# Patient Record
Sex: FEMALE | Race: BLACK OR AFRICAN AMERICAN | NOT HISPANIC OR LATINO | Employment: OTHER | URBAN - METROPOLITAN AREA
[De-identification: names, ages, dates, MRNs, and addresses within clinical notes are randomized per-mention and may not be internally consistent; named-entity substitution may affect disease eponyms.]

---

## 2017-10-12 ENCOUNTER — TELEPHONE (OUTPATIENT)
Dept: FAMILY MEDICINE | Facility: CLINIC | Age: 72
End: 2017-10-12

## 2017-10-12 NOTE — TELEPHONE ENCOUNTER
----- Message from Purnima Barnes sent at 10/12/2017  8:42 AM CDT -----  Contact: Self/ [phone]  Patient would like to speak with you about getting scheduled for a physical. Last time she saw Dr. Coburn was in 2013. Please advise.

## 2017-10-20 ENCOUNTER — LAB VISIT (OUTPATIENT)
Dept: LAB | Facility: HOSPITAL | Age: 72
End: 2017-10-20
Attending: FAMILY MEDICINE
Payer: MEDICARE

## 2017-10-20 ENCOUNTER — OFFICE VISIT (OUTPATIENT)
Dept: FAMILY MEDICINE | Facility: CLINIC | Age: 72
End: 2017-10-20
Payer: COMMERCIAL

## 2017-10-20 VITALS
HEART RATE: 62 BPM | OXYGEN SATURATION: 97 % | WEIGHT: 189.63 LBS | SYSTOLIC BLOOD PRESSURE: 139 MMHG | DIASTOLIC BLOOD PRESSURE: 73 MMHG | HEIGHT: 63 IN | BODY MASS INDEX: 33.6 KG/M2

## 2017-10-20 DIAGNOSIS — I51.89 DIASTOLIC DYSFUNCTION: ICD-10-CM

## 2017-10-20 DIAGNOSIS — Z12.39 SCREENING FOR MALIGNANT NEOPLASM OF BREAST: ICD-10-CM

## 2017-10-20 DIAGNOSIS — M19.90 ARTHRITIS: ICD-10-CM

## 2017-10-20 DIAGNOSIS — I10 ESSENTIAL HYPERTENSION: ICD-10-CM

## 2017-10-20 DIAGNOSIS — Z23 NEED FOR INFLUENZA VACCINATION: ICD-10-CM

## 2017-10-20 DIAGNOSIS — N18.30 CKD (CHRONIC KIDNEY DISEASE), STAGE III: ICD-10-CM

## 2017-10-20 DIAGNOSIS — E66.9 OBESITY (BMI 30.0-34.9): ICD-10-CM

## 2017-10-20 DIAGNOSIS — R73.01 IMPAIRED FASTING GLUCOSE: ICD-10-CM

## 2017-10-20 DIAGNOSIS — N18.30 CKD (CHRONIC KIDNEY DISEASE), STAGE III: Primary | ICD-10-CM

## 2017-10-20 DIAGNOSIS — E78.5 HYPERLIPIDEMIA, UNSPECIFIED HYPERLIPIDEMIA TYPE: ICD-10-CM

## 2017-10-20 DIAGNOSIS — Z23 NEED FOR STREPTOCOCCUS PNEUMONIAE VACCINATION: ICD-10-CM

## 2017-10-20 DIAGNOSIS — H25.10 NUCLEAR SCLEROSIS, UNSPECIFIED LATERALITY: ICD-10-CM

## 2017-10-20 LAB
ALBUMIN SERPL BCP-MCNC: 4 G/DL
ALP SERPL-CCNC: 100 U/L
ALT SERPL W/O P-5'-P-CCNC: 11 U/L
ANION GAP SERPL CALC-SCNC: 10 MMOL/L
AST SERPL-CCNC: 19 U/L
BASOPHILS # BLD AUTO: 0.05 K/UL
BASOPHILS NFR BLD: 0.6 %
BILIRUB SERPL-MCNC: 0.3 MG/DL
BUN SERPL-MCNC: 23 MG/DL
CALCIUM SERPL-MCNC: 10.2 MG/DL
CHLORIDE SERPL-SCNC: 102 MMOL/L
CHOLEST SERPL-MCNC: 258 MG/DL
CHOLEST/HDLC SERPL: 5.9 {RATIO}
CO2 SERPL-SCNC: 29 MMOL/L
CREAT SERPL-MCNC: 1.5 MG/DL
DIFFERENTIAL METHOD: ABNORMAL
EOSINOPHIL # BLD AUTO: 0.2 K/UL
EOSINOPHIL NFR BLD: 2.5 %
ERYTHROCYTE [DISTWIDTH] IN BLOOD BY AUTOMATED COUNT: 16.4 %
EST. GFR  (AFRICAN AMERICAN): 40.1 ML/MIN/1.73 M^2
EST. GFR  (NON AFRICAN AMERICAN): 34.8 ML/MIN/1.73 M^2
GLUCOSE SERPL-MCNC: 95 MG/DL
HCT VFR BLD AUTO: 39.6 %
HDLC SERPL-MCNC: 44 MG/DL
HDLC SERPL: 17.1 %
HGB BLD-MCNC: 12.5 G/DL
IMM GRANULOCYTES # BLD AUTO: 0.03 K/UL
IMM GRANULOCYTES NFR BLD AUTO: 0.4 %
LDLC SERPL CALC-MCNC: 183.8 MG/DL
LYMPHOCYTES # BLD AUTO: 3.3 K/UL
LYMPHOCYTES NFR BLD: 39.4 %
MCH RBC QN AUTO: 24.8 PG
MCHC RBC AUTO-ENTMCNC: 31.6 G/DL
MCV RBC AUTO: 78 FL
MONOCYTES # BLD AUTO: 0.5 K/UL
MONOCYTES NFR BLD: 6.5 %
NEUTROPHILS # BLD AUTO: 4.2 K/UL
NEUTROPHILS NFR BLD: 50.6 %
NONHDLC SERPL-MCNC: 214 MG/DL
NRBC BLD-RTO: 0 /100 WBC
PLATELET # BLD AUTO: 382 K/UL
PMV BLD AUTO: 10.7 FL
POTASSIUM SERPL-SCNC: 3.3 MMOL/L
PROT SERPL-MCNC: 8.5 G/DL
RBC # BLD AUTO: 5.05 M/UL
SODIUM SERPL-SCNC: 141 MMOL/L
TRIGL SERPL-MCNC: 151 MG/DL
WBC # BLD AUTO: 8.28 K/UL

## 2017-10-20 PROCEDURE — 90662 IIV NO PRSV INCREASED AG IM: CPT | Mod: S$GLB,,, | Performed by: FAMILY MEDICINE

## 2017-10-20 PROCEDURE — 80053 COMPREHEN METABOLIC PANEL: CPT

## 2017-10-20 PROCEDURE — 99215 OFFICE O/P EST HI 40 MIN: CPT | Mod: 25,S$GLB,, | Performed by: FAMILY MEDICINE

## 2017-10-20 PROCEDURE — 83036 HEMOGLOBIN GLYCOSYLATED A1C: CPT

## 2017-10-20 PROCEDURE — 36415 COLL VENOUS BLD VENIPUNCTURE: CPT | Mod: PO

## 2017-10-20 PROCEDURE — 80061 LIPID PANEL: CPT

## 2017-10-20 PROCEDURE — 90670 PCV13 VACCINE IM: CPT | Mod: S$GLB,,, | Performed by: FAMILY MEDICINE

## 2017-10-20 PROCEDURE — 90471 IMMUNIZATION ADMIN: CPT | Mod: S$GLB,,, | Performed by: FAMILY MEDICINE

## 2017-10-20 PROCEDURE — 99999 PR PBB SHADOW E&M-EST. PATIENT-LVL V: CPT | Mod: PBBFAC,,, | Performed by: FAMILY MEDICINE

## 2017-10-20 PROCEDURE — 90472 IMMUNIZATION ADMIN EACH ADD: CPT | Mod: S$GLB,,, | Performed by: FAMILY MEDICINE

## 2017-10-20 PROCEDURE — 85025 COMPLETE CBC W/AUTO DIFF WBC: CPT

## 2017-10-20 RX ORDER — BUTALBITAL, ACETAMINOPHEN AND CAFFEINE 50; 325; 40 MG/1; MG/1; MG/1
1 TABLET ORAL EVERY 6 HOURS PRN
Qty: 30 TABLET | Refills: 1 | Status: SHIPPED | OUTPATIENT
Start: 2017-10-20 | End: 2018-06-21

## 2017-10-20 RX ORDER — PANTOPRAZOLE SODIUM 40 MG/1
TABLET, DELAYED RELEASE ORAL
COMMUNITY
Start: 2017-07-31 | End: 2018-04-19

## 2017-10-20 RX ORDER — AMLODIPINE BESYLATE 10 MG/1
10 TABLET ORAL DAILY
Qty: 90 TABLET | Refills: 1 | Status: SHIPPED | OUTPATIENT
Start: 2017-10-20 | End: 2018-04-12 | Stop reason: SDUPTHER

## 2017-10-20 NOTE — PATIENT INSTRUCTIONS
Pneumococcal Vaccination  There are 2 pneumococcal vaccinations that protect people against pneumococcal disease.  Pneumococcal disease  Pneumococcal disease is caused by bacteria (Streptococcus pneumoniae). This germ is easily spread when someone with the bacteria coughs, sneezes, laughs, or talks. You can get pneumococcal disease more than once. This is because there are many different types (strains) of the bacteria. Some strains are also resistant to treatment with antibiotics.  There are different kinds of pneumococcal disease, depending on what part of the body is infected. They include:  · Pneumonia. Infection in the lungs.  · Meningitis. Infection of the covering of the brain and spinal cord.  · Otitis media. Infection of the middle ear.  · Bacteremia or septicemia. Infection in the blood.  Pneumococcal disease can be life-threatening, especially for people in high-risk groups. Each year, thousands of people die of this disease. Thousands more become seriously ill.  The vaccine    The pneumococcal vaccines are the best way to avoid pneumococcal disease. They are safe and effective. The vaccines are given as shots (injections). This can be done at your healthcare provider's office or a health clinic. Drugstores, senior centers, and workplaces often offer vaccinations, too. If you have questions, ask your healthcare provider.  The pneumococcal vaccines are recommended for:  · Persons 65 and older  · Infants  · People with chronic health problems (such as diabetes, chronic lung or heart disease, liver disease)  · People who have a cochlear implant  · People who have weakened immune systems  · People who live in nursing homes or other long-term care facilities  · People who smoke or have asthma  They are given:  · In a 4-dose series in infants  · One time in adults; some people need a second dose of one of the vaccines  Your healthcare provider can tell you more about the vaccines, whether you should get  them, and the number of shots you should get.  Date Last Reviewed: 11/1/2016  © 6063-9881 [employer]. [address] [place] [address], [place] [address]. All rights reserved. This information is not intended as a substitute for professional medical care. Always follow your healthcare professional's instructions.      Chronic Kidney Disease (CKD)     The role of the kidneys is to remove waste products and extra water from the blood.  When the kidneys do not work as they should, waste products begin to build up in the blood. This is called chronic kidney disease (CKD). CKD means that you have kidney damage or a decrease in kidney function lasting at least 3 months. CKD allows extra water, waste, and toxins to build up in the body. This can eventually become life-threatening. You might need dialysis or a kidney transplant to stay alive. This most severe form is called end stage renal disease.  Diabetes is the leading causes of chronic renal failure. Other causes include high blood pressure, hardening of the arteries (atherosclerosis), lupus, inflammation of the blood vessels (vasculitis), and past viral or bacterial infections. Certain over-the-counter pain medicines can cause renal failure when taken often over a long period of time. These include aspirin, ibuprofen, and related anti-inflammatory medicines called NSAIDs (nonsteroidal anti-inflammatory drugs).  Home care  The following guidelines will help you care for yourself at home:  · If you have diabetes, talk with your healthcare provider about keeping your blood sugar under control. Ask if you need to make and changes to your diet, lifestyle, or medicines.  · If you have high blood pressure:  ¨ Take prescribed medicine to lower your blood pressure to the recommended goal of less than 130/80.  ¨ Start a regular exercise program that you enjoy. Check with your healthcare provider to be sure your planned exercise program is right for you.  ¨ Eat less salt  (sodium). Your healthcare provider can tell you how much salt per day is safe for you.  · If you are overweight, talk with your healthcare provider about a weight loss plan.  · If you smoke, you must quit. Smoking makes kidney disease worse. Talk with your healthcare provider about ways to help you quit.  For more information, visit the following links:  ¨ www.Fetch MDfree.gov/sites/default/files/pdf/clearing-the-air-accessible.pdf  ¨ www.smokefree.gov  ¨ www.cancer.org/healthy/stayawayfromtobacco/guidetoquittingsmoking/  · Most people with CKD need to follow a special diet.  Be sure you understand yours. In general, you will need to limit protein, salt, potassium, and phosphorus. You also need to limit how much fluid you drink.   · CKD is a risk factor for heart disease. Talk with your healthcare provider about any other risk factors you might have and what you can do to lessen them.  · Talk with your healthcare provider about any medicines you are taking to find out if they need to be reduced or stopped.  · Don't use the following over-the-counter medicines, or consult your healthcare provider before using:  ¨ Aspirin and NSAIDs such as ibuprofen or naproxen. Using acetaminophen for fever or pain is OK.  ¨ Laxatives and antacids containing magnesium or aluminum  ¨ Fleet or phospho soda enemas containing phosphorus  ¨ Certain stomach acid-blocking medicine such as cimetidine or ranitidine   ¨ Decongestants containing pseudoephedrine   ¨ Herbal supplements  Follow-up care  Follow up with your healthcare provider, or as advised. Contact one of the following for more information:  · American Association of Kidney Patients 026-332-3307 www.aakp.org  · National Kidney Foundation 054-511-9456 www.kidney.org  · American Kidney Fund 523-074-8586 www.kidneyfund.org  · National Kidney Disease Education Program 866-4KIDNEY www.nkdep.nih.gov  If an X-ray, ECG (cardiogram), or other diagnostic test was taken, you will be told of  any new findings that may affect your care.  Call 911  Call 911 if you have any of the following:  · Severe weakness, dizziness, fainting, drowsiness, or confusion  · Chest pain or shortness of breath  · Heart beating fast, slow, or irregularly  When to seek medical advice  Call your healthcare provider right away if any of these occur:  · Nausea or vomiting  · Fever of 100.4°F (38°C) or higher, or as directed by your healthcare provider  · Unexpected weight gain or swelling in the legs, ankles, or around the eyes  · Decrease or absent urine output  Date Last Reviewed: 9/1/2016 © 2000-2017 [employer]. [address] [place] [address], [place] [address]. All rights reserved. This information is not intended as a substitute for professional medical care. Always follow your healthcare professional's instructions.      Low-Salt Diet  This diet removes foods that are high in salt. It also limits the amount of salt you use when cooking. It is most often used for people with high blood pressure, edema (fluid retention), and kidney, liver, or heart disease.  Table salt contains the mineral sodium. Your body needs sodium to work normally. But too much sodium can make your health problems worse. Your healthcare provider is recommending a low-salt (also called low-sodium) diet for you. Your total daily allowance of salt is 1,500 to 2,300 milligrams (mg). It is less than 1 teaspoon of table salt. This means you can have only about 500 to 700 mg of sodium at each meal. People with certain health problems should limit salt intake to the lower end of the recommended range.    When you cook, dont add much salt. If you can cook without using salt, even better. Dont add salt to your food at the table.  When shopping, read food labels. Salt is often called sodium on the label. Choose foods that are salt-free, low salt, or very low salt. Note that foods with reduced salt may not lower your salt intake enough.    Beans,  potatoes, and pasta  Ok: Dry beans, split peas, lentils, potatoes, rice, macaroni, pasta, spaghetti without added salt  Avoid: Potato chips, tortilla chips, and similar products  Breads and cereals  Ok: Low-sodium breads, rolls, cereals, and cakes; low-salt crackers, matzo crackers  Avoid: Salted crackers, pretzels, popcorn, Japanese toast, pancakes, muffins  Dairy  Ok: Milk, chocolate milk, hot chocolate mix, low-salt cheeses, and yogurt  Avoid: Processed cheese and cheese spreads; Roquefort, Camembert, and cottage cheese; buttermilk, instant breakfast drink  Desserts  Ok: Ice cream, frozen yogurt, juice bars, gelatin, cookies and pies, sugar, honey, jelly, hard candy  Avoid: Most pies, cakes and cookies prepared or processed with salt; instant pudding  Drinks  Ok: Tea, coffee, fizzy (carbonated) drinks, juices  Avoid: Flavored coffees, electrolyte replacement drinks, sports drinks  Meats  Ok: All fresh meat, fish, poultry, low-salt tuna, eggs, egg substitute  Avoid: Smoked, pickled, brine-cured, or salted meats and fish. This includes rodrigez, chipped beef, corned beef, hot dogs, deli meats, ham, kosher meats, salt pork, sausage, canned tuna, salted codfish, smoked salmon, herring, sardines, or anchovies.  Seasonings and spices  Ok: Most seasonings are okay. Good substitutes for salt include: fresh herb blends, hot sauce, lemon, garlic, ruiz, vinegar, dry mustard, parsley, cilantro, horseradish, tomato paste, regular margarine, mayonnaise, unsalted butter, cream cheese, vegetable oil, cream, low-salt salad dressing and gravy.  Avoid: Regular ketchup, relishes, pickles, soy sauce, teriyaki sauce, Worcestershire sauce, BBQ sauce, tartar sauce, meat tenderizer, chili sauce, regular gravy, regular salad dressing, salted butter  Soups  Ok: Low-salt soups and broths made with allowed foods  Avoid: Bouillon cubes, soups with smoked or salted meats, regular soup and broth  Vegetables  Ok: Most vegetables are okay; also  low-salt tomato and vegetable juices  Avoid: Sauerkraut and other brine-soaked vegetables; pickles and other pickled vegetables; tomato juice, olives  Date Last Reviewed: 8/1/2016  © 7363-9101 [employer]. [address] [place] [address]. All rights reserved. This information is not intended as a substitute for professional medical care. Always follow your healthcare professional's instructions.

## 2017-10-20 NOTE — PROGRESS NOTES
"Subjective:       Patient ID: Sulema Burdick is a 72 y.o. female.    Chief Complaint: Dry Eye (x one month ); Eye Burn (x one month ); Arm Pain (x 8 month); Fall (x 8 months ago ); Orders (mammogram order ); and Orders (kidney test )    72 year old female presents today, hx of multiple chronic diseases, to establish care. She states that she is very active. She states that she walks a lot with friends and with her dogs. She also cleans in the house. She is stating that she is achy. She has been a stable weight for aprox 5 years. Her diet consists of "what she wants" but in moderation. She eats eggs, toast, minimal bread, more baked then fried food. She does like her pork and sweets.     Has HTN, on amlodipine and HCTZ and apparently spironolactone PRN. Has stopped taking her statin due to myalgias.     Was being seen at [facility]. She had seen a cardiologist in the past, 2015.     PMHx: reviewed in EMR and updated  Meds: reviewed in EMR and updated  Shx: reviewed in EMR and updated  FMHx: reviewed in EMR and updated  Social:  for over 50 years. Two children. Recently retired from [employer], [employer]. She lives in a house. She has about 5 steps, has a railing.     Mammogram: ordered today  PCV13: ordered today  Flu: ordered today  Zoster: to get at the pharmacy  Colonoscopy: done last year and was normal per patient (done at outside facility), reviewed EMR records for the scope prior to that      Hypertension   This is a chronic problem. The current episode started more than 1 year ago. The problem has been waxing and waning since onset. The problem is controlled. Pertinent negatives include no anxiety, blurred vision, chest pain, headaches, peripheral edema or shortness of breath. There are no associated agents to hypertension. Risk factors for coronary artery disease include dyslipidemia, obesity and family history. Past treatments include calcium channel blockers and diuretics. " The current treatment provides moderate improvement. There are no compliance problems.  There is no history of chronic renal disease.   Arthritis   Presents for initial visit. The disease course has been fluctuating. She complains of pain and stiffness. Affected locations include the left hip (left back). Her pain is at a severity of 4/10. Associated symptoms include dry eyes. Pertinent negatives include no fever. Her past medical history is significant for chronic back pain and osteoarthritis. Past treatments include nothing. Factors aggravating her arthritis include entering/exiting a vehicle and activity. Compliance with prior treatments has been variable.   Eye Problem    The left eye is affected. This is a chronic problem. The current episode started more than 1 year ago. There was no injury mechanism (history of cataract surgery, it feels dry and itchy). The pain is mild. There is no known exposure to pink eye. She does not wear contacts. Associated symptoms include itching. Pertinent negatives include no blurred vision or fever. She has tried nothing for the symptoms. The treatment provided no relief.     Review of Systems   Constitutional: Negative for fever.   Eyes: Positive for itching. Negative for blurred vision.   Respiratory: Negative for shortness of breath.    Cardiovascular: Negative for chest pain.   Musculoskeletal: Positive for arthritis and stiffness.   Neurological: Negative for headaches.         Health Maintenance Due   Topic Date Due    TETANUS VACCINE  01/05/1963    Zoster Vaccine  01/05/2006    Pneumococcal (65+) (2 of 2 - PCV13) 06/25/2014    Mammogram  09/14/2016    Influenza Vaccine  08/01/2017     Immunization History   Administered Date(s) Administered    Pneumococcal Polysaccharide - 23 Valent 06/25/2013       Objective:      Physical Exam   Constitutional: She is oriented to person, place, and time. She appears well-developed and well-nourished.   HENT:   Right Ear: Tympanic  membrane, external ear and ear canal normal.   Left Ear: Tympanic membrane, external ear and ear canal normal.   Mouth/Throat: Mucous membranes are normal.   Cobblestoning noted  Nasal congestion noted   Eyes: EOM are normal. Pupils are equal, round, and reactive to light.   Mild conjunctivae injection noted BL   Neck: Normal range of motion. Neck supple.   Cardiovascular: Normal rate, regular rhythm and normal heart sounds.    Pulmonary/Chest: Effort normal and breath sounds normal. No respiratory distress.   Abdominal: Soft. Bowel sounds are normal. She exhibits no distension.   obese   Musculoskeletal: She exhibits no edema.   Neurological: She is alert and oriented to person, place, and time. No cranial nerve deficit or sensory deficit. She exhibits normal muscle tone.   Skin: Skin is warm and dry.   Psychiatric: She has a normal mood and affect. Her speech is normal and behavior is normal.   Nursing note and vitals reviewed.      Assessment:       1. CKD (chronic kidney disease), stage III    2. Diastolic dysfunction    3. Essential hypertension    4. Hyperlipidemia, unspecified hyperlipidemia type    5. Arthritis    6. Nuclear sclerosis, unspecified laterality    7. Screening for malignant neoplasm of breast    8. Obesity (BMI 30.0-34.9)    9. Need for Streptococcus pneumoniae vaccination    10. Impaired fasting glucose    11. Need for influenza vaccination        Plan:         CKD (chronic kidney disease), stage III  Labs today  No records from the previous 3 years  Refer to renal  BP controlled today but patient seems to take her spironolactone intermittently  Low salt diet  Appreciate renal recs  -     Ambulatory referral to Nephrology  -     CBC auto differential; Future; Expected date: 10/20/2017  -     Comprehensive metabolic panel; Future; Expected date: 10/20/2017  -     Ambulatory referral to Cardiology    Essential hypertension  BP OK today  Renal and cards consult  amlodipine and hctz to be  continued  Recommended that patient continue spironolactone  Will check urine microalbumin  -     Ambulatory referral to Nephrology  -     Lipid panel; Future; Expected date: 10/20/2017  -     amlodipine (NORVASC) 10 MG tablet; Take 1 tablet (10 mg total) by mouth once daily.  Dispense: 90 tablet; Refill: 1  -     Ambulatory referral to Cardiology    Hyperlipidemia, unspecified hyperlipidemia type  Has declined statin in the past  Will try to start on lower dose based on new lab work  -     Lipid panel; Future; Expected date: 10/20/2017    Arthritis  No NSAIDs given CKD, counseled patient repeatedly on this  OK to take tylenol    Nuclear sclerosis, unspecified laterality  -     Ambulatory referral to Ophthalmology    Screening for malignant neoplasm of breast  -     Mammo Digital Screening Bilat with CAD; Future; Expected date: 10/20/2017    Obesity (BMI 30.0-34.9)  Diet and Exercise  DASH diet  -     Hemoglobin A1c; Future; Expected date: 10/20/2017    Need for Streptococcus pneumoniae vaccination  -     (In Office Administered) Pneumococcal Conjugate Vaccine (13 Valent) (IM)    Impaired fasting glucose  -     Hemoglobin A1c; Future; Expected date: 10/20/2017    Other orders  -     butalbital-acetaminophen-caffeine -40 mg (FIORICET, ESGIC) -40 mg per tablet; Take 1 tablet by mouth every 6 (six) hours as needed for Pain.  Dispense: 30 tablet; Refill: 1      40 minutes spent with patient, of which >50% was spent on counseling and coordination of care.

## 2017-10-21 LAB
ESTIMATED AVG GLUCOSE: 117 MG/DL
HBA1C MFR BLD HPLC: 5.7 %

## 2017-10-27 ENCOUNTER — TELEPHONE (OUTPATIENT)
Dept: FAMILY MEDICINE | Facility: CLINIC | Age: 72
End: 2017-10-27

## 2017-10-27 NOTE — TELEPHONE ENCOUNTER
I have tried calling the patient multiple times on the number at file and it doesn't ring. I have had my staff try and they have had the same results. I tried calling the 's number today and also got no response. I have sent a letter to the patient with the following note:     Your results are abnormal. I have tried calling the number on file multiple times over the last two weeks but have not been able to get through. Your results show that you have high cholesterol and that the ACC/AHA guidelines suggest you should be on a moderate to high intensity statin. I know that you have declined a statin medication in the past, so we can discuss starting a low dose statin in the future. In addition, your A1c shows that you are pre-diabetic. Please decrease your intake of white bread, white rice, corn, pasta, potatoes and sugar to prevent diabetes. Regular daily exercise will also decrease your risk of developing diabetes. Your kidney function (GFR) has also decreased and I would strongly advise you to see the kidney doctor for the appointment that has been scheduled on 11/29/17. It is vital that you have proper blood pressure control in order to keep your kidney's functioning properly. Your potassium was also slightly low, please start an over the counter potassium replacement once daily. Please don't hesitate to call our office if you have any questions or concerns and please try to see me earlier then your currently scheduled appointment.     This message will be sent to my staff as an FYI. If she call back, I would like to schedule her to see me earlier then her current appointment.

## 2017-10-27 NOTE — TELEPHONE ENCOUNTER
Spoke with patient about getting a sooner appointment with Dr Brunner on 11/01/17 @ 10 am to review labs.

## 2017-11-02 ENCOUNTER — OFFICE VISIT (OUTPATIENT)
Dept: FAMILY MEDICINE | Facility: CLINIC | Age: 72
End: 2017-11-02
Payer: MEDICARE

## 2017-11-02 VITALS
DIASTOLIC BLOOD PRESSURE: 89 MMHG | BODY MASS INDEX: 33.49 KG/M2 | HEIGHT: 63 IN | OXYGEN SATURATION: 97 % | HEART RATE: 81 BPM | SYSTOLIC BLOOD PRESSURE: 150 MMHG | WEIGHT: 189 LBS

## 2017-11-02 DIAGNOSIS — I10 ESSENTIAL HYPERTENSION: Primary | ICD-10-CM

## 2017-11-02 DIAGNOSIS — E78.5 HYPERLIPIDEMIA, UNSPECIFIED HYPERLIPIDEMIA TYPE: ICD-10-CM

## 2017-11-02 DIAGNOSIS — N18.30 CKD (CHRONIC KIDNEY DISEASE), SYMPTOM MANAGEMENT ONLY, STAGE 3 (MODERATE): ICD-10-CM

## 2017-11-02 DIAGNOSIS — L29.9 ITCHING: ICD-10-CM

## 2017-11-02 DIAGNOSIS — R73.01 ELEVATED FASTING GLUCOSE: ICD-10-CM

## 2017-11-02 DIAGNOSIS — R80.9 MICROALBUMINURIA: ICD-10-CM

## 2017-11-02 PROCEDURE — 99999 PR PBB SHADOW E&M-EST. PATIENT-LVL III: CPT | Mod: PBBFAC,,, | Performed by: FAMILY MEDICINE

## 2017-11-02 PROCEDURE — 99215 OFFICE O/P EST HI 40 MIN: CPT | Mod: S$GLB,,, | Performed by: FAMILY MEDICINE

## 2017-11-02 RX ORDER — CETIRIZINE HYDROCHLORIDE 10 MG/1
10 TABLET ORAL DAILY
Qty: 30 TABLET | Refills: 1 | COMMUNITY
Start: 2017-11-02 | End: 2018-10-25 | Stop reason: SDUPTHER

## 2017-11-02 RX ORDER — SPIRONOLACTONE 25 MG/1
25 TABLET ORAL DAILY
Qty: 90 TABLET | Refills: 0
Start: 2017-11-02 | End: 2018-09-11 | Stop reason: ALTCHOICE

## 2017-11-02 RX ORDER — ATORVASTATIN CALCIUM 10 MG/1
10 TABLET, FILM COATED ORAL DAILY
Qty: 90 TABLET | Refills: 3 | Status: SHIPPED | OUTPATIENT
Start: 2017-11-02 | End: 2018-04-19 | Stop reason: ALTCHOICE

## 2017-11-02 NOTE — PROGRESS NOTES
"Subjective:       Patient ID: Sulema Burdick is a 72 y.o. female.    Chief Complaint: No chief complaint on file.    Sulema is a 72 y.o. female well known to me who presents today for follow up on lab results. Physician was initially unable to get a hold of the patient. Her results show that she had high cholesterol and that the ACC/AHA guidelines suggest she should be on a moderate to high intensity statin. She had previously declined a statin medication in the past, however she was agreeable to starting atorvastatin 10 mg today. In addition, her A1c shows that she is pre-diabetic. Patient was counseled to  decrease her intake of white bread, white rice, corn, pasta, potatoes and sugar to prevent diabetes. Regular daily exercise will also decrease her risk of developing diabetes.     Lastly, patient was counseled that her  kidney function (GFR) has  decreased and she has an appointment to see the kidney doctor scheduled on 11/29/17. It is vital that she has proper blood pressure control in order to keep your kidney's functioning properly. Patient reports that she only takes her spironolactone about 1x/week. Given resistant BP and low potassium, it's possible that patient has hyperaldosteronism and this is being treated with the spironolactone Patient reports that the medicine "makes her spit" which she really dislikes to do at [employer]. She is agreeable to taking the medicine M-F when she does not go to [employer].     Although her potassium was also slightly low, potassium is not started as patient agrees to take spironolactone regularly.     Patient is also complaining of some itching. No associated fevers, rash, headache, double vision, blurry vision. No swelling in her legs noted today.       Review of Systems   Constitutional: Negative for fever.   Eyes: Negative for visual disturbance.   Respiratory: Negative for cough.    Cardiovascular: Negative for leg swelling.   Neurological: Negative for headaches.         Lab " Review   Lab Visit on 10/20/2017   Component Date Value    Microalbum.,U,Random 10/20/2017 79.0     Creatinine, Random Ur 10/20/2017 51.0     Microalb Creat Ratio 10/20/2017 154.9*   Lab Visit on 10/20/2017   Component Date Value    WBC 10/20/2017 8.28     RBC 10/20/2017 5.05     Hemoglobin 10/20/2017 12.5     Hematocrit 10/20/2017 39.6     MCV 10/20/2017 78*    MCH 10/20/2017 24.8*    MCHC 10/20/2017 31.6*    RDW 10/20/2017 16.4*    Platelets 10/20/2017 382*    MPV 10/20/2017 10.7     Immature Granulocytes 10/20/2017 0.4     Gran # 10/20/2017 4.2     Immature Grans (Abs) 10/20/2017 0.03     Lymph # 10/20/2017 3.3     Mono # 10/20/2017 0.5     Eos # 10/20/2017 0.2     Baso # 10/20/2017 0.05     nRBC 10/20/2017 0     Gran% 10/20/2017 50.6     Lymph% 10/20/2017 39.4     Mono% 10/20/2017 6.5     Eosinophil% 10/20/2017 2.5     Basophil% 10/20/2017 0.6     Differential Method 10/20/2017 Automated     Sodium 10/20/2017 141     Potassium 10/20/2017 3.3*    Chloride 10/20/2017 102     CO2 10/20/2017 29     Glucose 10/20/2017 95     BUN, Bld 10/20/2017 23     Creatinine 10/20/2017 1.5*    Calcium 10/20/2017 10.2     Total Protein 10/20/2017 8.5*    Albumin 10/20/2017 4.0     Total Bilirubin 10/20/2017 0.3     Alkaline Phosphatase 10/20/2017 100     AST 10/20/2017 19     ALT 10/20/2017 11     Anion Gap 10/20/2017 10     eGFR if  10/20/2017 40.1*    eGFR if non  Amer* 10/20/2017 34.8*    Hemoglobin A1C 10/21/2017 5.7*    Estimated Avg Glucose 10/21/2017 117     Cholesterol 10/20/2017 258*    Triglycerides 10/20/2017 151*    HDL 10/20/2017 44     LDL Cholesterol 10/20/2017 183.8*    HDL/Chol Ratio 10/20/2017 17.1*    Total Cholesterol/HDL Ra* 10/20/2017 5.9*    Non-HDL Cholesterol 10/20/2017 214         Objective:     Vitals:    11/02/17 1024   BP: (!) 150/89   Pulse: 81        Physical Exam   Constitutional: She appears well-developed and  well-nourished.   HENT:   Head: Normocephalic and atraumatic.   Cardiovascular: Normal rate and regular rhythm.    Pulmonary/Chest: Effort normal and breath sounds normal.   Abdominal: Soft.   Musculoskeletal: She exhibits no edema.   Neurological: No sensory deficit. She exhibits normal muscle tone.   Skin: Skin is warm. No rash noted.   Psychiatric: She has a normal mood and affect. Her behavior is normal. Judgment and thought content normal.   Nursing note and vitals reviewed.      Assessment:       1. Essential hypertension    2. Hyperlipidemia, unspecified hyperlipidemia type    3. CKD (chronic kidney disease), symptom management only, stage 3 (moderate)    4. Itching    5. Microalbuminuria    6. Elevated fasting glucose        Plan:       Patient seen today to review lab results that had multiple abnormalities that required follow up. Lab results and explanation reviewed extensively with the patient. She was counseled on the importance of adequate BP control and avoidance of NSAIDs. She will go to her appointment with the nephrologist as scheduled. She agrees to take aldactone M-F, defer medication management to renal pending her BP results after adequate therapy. Ddx includes hyperaldosteronism as patient is hypokalemic with uncontrolled Bp, but spironolactone will treat the BP and low potassium if taken as directed. This was also explained to the patient. Atorvastatin 10 mg started after discussion with patient, she was resistant initially but is agreeable. Zyrtec was started for itching. No signs of rash noted. If itching persists, will consider derm referral.     Patient was advised to decrease her intake of white bread, white rice, corn, pasta, potatoes and sugar to prevent diabetes. Regular daily exercise will also decrease her risk of developing diabetes.    Patient reports GERD is better controlled after she started taking Protonix first thing in the Am.       Plan  -     spironolactone (ALDACTONE) 25  MG tablet; Take 1 tablet (25 mg total) by mouth once daily.  Dispense: 90 tablet; Refill: 0  -     atorvastatin (LIPITOR) 10 MG tablet; Take 1 tablet (10 mg total) by mouth once daily.  Dispense: 90 tablet; Refill: 3  -     cetirizine (ZYRTEC) 10 MG tablet; Take 1 tablet (10 mg total) by mouth once daily.  Dispense: 30 tablet; Refill: 1    Warning signs discussed, patient to call with any further issues or worsening of symptoms.     40 minutes spent with patient, of which >50% was spent on counseling and coordination of care.

## 2017-11-02 NOTE — PATIENT INSTRUCTIONS
Low-Salt Diet  This diet removes foods that are high in salt. It also limits the amount of salt you use when cooking. It is most often used for people with high blood pressure, edema (fluid retention), and kidney, liver, or heart disease.  Table salt contains the mineral sodium. Your body needs sodium to work normally. But too much sodium can make your health problems worse. Your healthcare provider is recommending a low-salt (also called low-sodium) diet for you. Your total daily allowance of salt is 1,500 to 2,300 milligrams (mg). It is less than 1 teaspoon of table salt. This means you can have only about 500 to 700 mg of sodium at each meal. People with certain health problems should limit salt intake to the lower end of the recommended range.    When you cook, dont add much salt. If you can cook without using salt, even better. Dont add salt to your food at the table.  When shopping, read food labels. Salt is often called sodium on the label. Choose foods that are salt-free, low salt, or very low salt. Note that foods with reduced salt may not lower your salt intake enough.    Beans, potatoes, and pasta  Ok: Dry beans, split peas, lentils, potatoes, rice, macaroni, pasta, spaghetti without added salt  Avoid: Potato chips, tortilla chips, and similar products  Breads and cereals  Ok: Low-sodium breads, rolls, cereals, and cakes; low-salt crackers, matzo crackers  Avoid: Salted crackers, pretzels, popcorn, Romanian toast, pancakes, muffins  Dairy  Ok: Milk, chocolate milk, hot chocolate mix, low-salt cheeses, and yogurt  Avoid: Processed cheese and cheese spreads; Roquefort, Camembert, and cottage cheese; buttermilk, instant breakfast drink  Desserts  Ok: Ice cream, frozen yogurt, juice bars, gelatin, cookies and pies, sugar, honey, jelly, hard candy  Avoid: Most pies, cakes and cookies prepared or processed with salt; instant pudding  Drinks  Ok: Tea, coffee, fizzy (carbonated) drinks, juices  Avoid: Flavored  coffees, electrolyte replacement drinks, sports drinks  Meats  Ok: All fresh meat, fish, poultry, low-salt tuna, eggs, egg substitute  Avoid: Smoked, pickled, brine-cured, or salted meats and fish. This includes rodrigez, chipped beef, corned beef, hot dogs, deli meats, ham, kosher meats, salt pork, sausage, canned tuna, salted codfish, smoked salmon, herring, sardines, or anchovies.  Seasonings and spices  Ok: Most seasonings are okay. Good substitutes for salt include: fresh herb blends, hot sauce, lemon, garlic, ruiz, vinegar, dry mustard, parsley, cilantro, horseradish, tomato paste, regular margarine, mayonnaise, unsalted butter, cream cheese, vegetable oil, cream, low-salt salad dressing and gravy.  Avoid: Regular ketchup, relishes, pickles, soy sauce, teriyaki sauce, Worcestershire sauce, BBQ sauce, tartar sauce, meat tenderizer, chili sauce, regular gravy, regular salad dressing, salted butter  Soups  Ok: Low-salt soups and broths made with allowed foods  Avoid: Bouillon cubes, soups with smoked or salted meats, regular soup and broth  Vegetables  Ok: Most vegetables are okay; also low-salt tomato and vegetable juices  Avoid: Sauerkraut and other brine-soaked vegetables; pickles and other pickled vegetables; tomato juice, olives  Date Last Reviewed: 8/1/2016 © 2000-2017 [employer]. [address] [place] [address]. All rights reserved. This information is not intended as a substitute for professional medical care. Always follow your healthcare professional's instructions.        4 Steps for Eating Healthier  Changing the way you eat can improve your health. It can lower your cholesterol and blood pressure, and help you stay at a healthy weight. Your diet doesnt have to be bland and boring to be healthy. Just watch your calories and follow these steps:    1. Eat fewer unhealthy fats  · Choose more fish and lean meats instead of fatty cuts of meat.  · Skip butter and lard, and use less  margarine.  · Pass on foods that have palm, coconut, or hydrogenated oils.  · Eat fewer high-fat dairy foods like cheese, ice cream, and whole milk.  · Get a heart-healthy cookbook and try some low-fat recipes.  2. Go light on salt  · Keep the saltshaker off the table.  · Limit high-salt ingredients, such as soy sauce, bouillon, and garlic salt.  · Instead of adding salt when cooking, season your food with herbs and flavorings. Try lemon, garlic, and onion.  · Limit convenience foods, such as boxed or canned foods and restaurant food.  · Read food labels and choose lower-sodium options.  3. Limit sugar  · Pause before you add sugars to pancakes, cereal, coffee, or tea. This includes white and brown table sugar, syrup, honey, and molasses. Cut your usual amount by half.  · Use non-sugar sweeteners. Stevia, aspartame, and sucralose can satisfy a sweet tooth without adding calories.  · Swap out sugar-filled soda and other drinks. Buy sugar-free or low-calorie beverages. Remember water is always the best choice.  · Read labels and choose foods with less added sugar. Keep in mind that dairy foods and foods with fruit will have some natural sugar.  · Cut the sugar in recipes by 1/3 to 1/2. Boost the flavor with extracts like almond, vanilla, or orange. Or add spices such as cinnamon or nutmeg.  4. Eat more fiber  · Eat fresh fruits and vegetables every day.  · Boost your diet with whole grains. Go for oats, whole-grain rice, and bran.  · Add beans and lentils to your meals.  · Drink more water to match your fiber increase. This is to help prevent constipation.  Date Last Reviewed: 5/11/2015  © 2713-2933 [employer]. [address] [place] [address], [place] [address]. All rights reserved. This information is not intended as a substitute for professional medical care. Always follow your healthcare professional's instructions.

## 2017-11-06 ENCOUNTER — OFFICE VISIT (OUTPATIENT)
Dept: OPHTHALMOLOGY | Facility: CLINIC | Age: 72
End: 2017-11-06
Payer: MEDICARE

## 2017-11-06 DIAGNOSIS — H16.223 KERATOCONJUNCTIVITIS SICCA NOT SPECIFIED AS SJOGREN'S, BILATERAL: ICD-10-CM

## 2017-11-06 DIAGNOSIS — Z98.41 STATUS POST CATARACT EXTRACTION AND INSERTION OF INTRAOCULAR LENS, RIGHT: ICD-10-CM

## 2017-11-06 DIAGNOSIS — Z96.1 STATUS POST CATARACT EXTRACTION AND INSERTION OF INTRAOCULAR LENS, RIGHT: ICD-10-CM

## 2017-11-06 DIAGNOSIS — H25.11 NUCLEAR SCLEROTIC CATARACT OF RIGHT EYE: Primary | ICD-10-CM

## 2017-11-06 PROCEDURE — 92004 COMPRE OPH EXAM NEW PT 1/>: CPT | Mod: S$GLB,,, | Performed by: OPHTHALMOLOGY

## 2017-11-06 PROCEDURE — 99999 PR PBB SHADOW E&M-EST. PATIENT-LVL III: CPT | Mod: PBBFAC,,, | Performed by: OPHTHALMOLOGY

## 2017-11-06 NOTE — LETTER
November 7, 2017      Usman Brunner, DO  [address]  [place] [address]           [facility] Ophthalmology  [address]  [facility] [address]  Phone: [phone]  Fax: [phone]          Patient: Sulema Burdick   MR Number: [id number]   YOB: 1945   Date of Visit: 11/6/2017       Dear Dr. Usman Brunner:    Thank you for referring Sulema Burdick to me for evaluation. Attached you will find relevant portions of my assessment and plan of care.    If you have questions, please do not hesitate to call me. I look forward to following Sulema Burdick along with you.    Sincerely,    Rosy Ballard MD    Enclosure  CC:  No Recipients    If you would like to receive this communication electronically, please contact [email or web address] or [phone] to request more information on [employer] Link access.    For providers and/or their staff who would like to refer a patient to [facility], please contact us through our one-stop-shop provider referral line, [facility], at [phone].    If you feel you have received this communication in error or would no longer like to receive these types of communications, please e-mail [email or web address]

## 2017-11-06 NOTE — PROGRESS NOTES
HPI     Oc Suarez MD    S/p phaco w/IOL OS 7/8/13    Gtts: Visine PRN    Patient returns for cataract eval OD.  c/o dry, itchy and   irritated eyes and FBS. Pt started using visine once a day OU. Occasional   tracy pain OS. No f/f.     Last edited by Rosy Ballard MD on 11/6/2017 10:49 AM. (History)            Assessment /Plan     For exam results, see Encounter Report.    Nuclear sclerotic cataract of right eye    Status post cataract extraction and insertion of intraocular lens, right    Keratoconjunctivitis sicca not specified as Sjogren's, bilateral      S/p phaco w/IOL OS 7/8/13 - benjy  - early PCO, not VS    CAT OD  - not VS, observe    K sicca  - ATs, stop visine.    F/up dr. Abbott 6 mo

## 2017-11-20 ENCOUNTER — TELEPHONE (OUTPATIENT)
Dept: FAMILY MEDICINE | Facility: CLINIC | Age: 72
End: 2017-11-20

## 2017-11-20 ENCOUNTER — HOSPITAL ENCOUNTER (OUTPATIENT)
Dept: RADIOLOGY | Facility: HOSPITAL | Age: 72
Discharge: HOME | End: 2017-11-20
Attending: FAMILY MEDICINE
Payer: MEDICARE

## 2017-11-20 DIAGNOSIS — Z12.39 SCREENING FOR MALIGNANT NEOPLASM OF BREAST: ICD-10-CM

## 2017-11-20 PROCEDURE — 77067 SCR MAMMO BI INCL CAD: CPT | Mod: 26,,, | Performed by: RADIOLOGY

## 2017-11-20 PROCEDURE — 77067 SCR MAMMO BI INCL CAD: CPT | Mod: TC

## 2017-11-20 PROCEDURE — 77063 BREAST TOMOSYNTHESIS BI: CPT | Mod: 26,,, | Performed by: RADIOLOGY

## 2017-11-20 NOTE — TELEPHONE ENCOUNTER
----- Message from Usman Brunner DO sent at 11/20/2017 11:49 AM CST -----  Please call the patient regarding her normal result.

## 2018-04-09 DIAGNOSIS — I10 ESSENTIAL HYPERTENSION: ICD-10-CM

## 2018-04-09 RX ORDER — HYDROCHLOROTHIAZIDE 25 MG/1
TABLET ORAL
Qty: 30 TABLET | Refills: 0 | Status: SHIPPED | OUTPATIENT
Start: 2018-04-09 | End: 2018-06-18 | Stop reason: SDUPTHER

## 2018-04-12 DIAGNOSIS — I10 ESSENTIAL HYPERTENSION: ICD-10-CM

## 2018-04-12 RX ORDER — AMLODIPINE BESYLATE 10 MG/1
TABLET ORAL
Qty: 30 TABLET | Refills: 0 | Status: SHIPPED | OUTPATIENT
Start: 2018-04-12 | End: 2018-05-09 | Stop reason: SDUPTHER

## 2018-04-12 RX ORDER — AMLODIPINE BESYLATE 10 MG/1
TABLET ORAL
Qty: 90 TABLET | Refills: 0 | OUTPATIENT
Start: 2018-04-12

## 2018-04-12 NOTE — TELEPHONE ENCOUNTER
She needs to see either myself or renal for further medication refills. Please call patient and let her know

## 2018-04-12 NOTE — TELEPHONE ENCOUNTER
----- Message from Usman Brunner DO sent at 4/12/2018 10:33 AM CDT -----  She needs to see either me or renal to see her Bp. I have refilled her rx x 1

## 2018-04-19 ENCOUNTER — OFFICE VISIT (OUTPATIENT)
Dept: FAMILY MEDICINE | Facility: CLINIC | Age: 73
End: 2018-04-19
Payer: MEDICARE

## 2018-04-19 VITALS
HEIGHT: 63 IN | BODY MASS INDEX: 34.25 KG/M2 | DIASTOLIC BLOOD PRESSURE: 70 MMHG | OXYGEN SATURATION: 95 % | HEART RATE: 67 BPM | WEIGHT: 193.31 LBS | SYSTOLIC BLOOD PRESSURE: 158 MMHG

## 2018-04-19 DIAGNOSIS — M54.32 SCIATICA OF LEFT SIDE: ICD-10-CM

## 2018-04-19 DIAGNOSIS — Z78.9 STATIN INTOLERANCE: ICD-10-CM

## 2018-04-19 DIAGNOSIS — R73.01 ELEVATED FASTING GLUCOSE: ICD-10-CM

## 2018-04-19 DIAGNOSIS — J32.9 CHRONIC SINUSITIS, UNSPECIFIED LOCATION: ICD-10-CM

## 2018-04-19 DIAGNOSIS — E87.6 HYPOKALEMIA: ICD-10-CM

## 2018-04-19 DIAGNOSIS — K21.9 GASTROESOPHAGEAL REFLUX DISEASE, ESOPHAGITIS PRESENCE NOT SPECIFIED: ICD-10-CM

## 2018-04-19 DIAGNOSIS — R80.9 MICROALBUMINURIA: ICD-10-CM

## 2018-04-19 DIAGNOSIS — N18.30 CKD (CHRONIC KIDNEY DISEASE), STAGE III: ICD-10-CM

## 2018-04-19 DIAGNOSIS — R05.3 CHRONIC COUGH: ICD-10-CM

## 2018-04-19 DIAGNOSIS — I10 ESSENTIAL HYPERTENSION: Primary | ICD-10-CM

## 2018-04-19 DIAGNOSIS — E78.5 HYPERLIPIDEMIA, UNSPECIFIED HYPERLIPIDEMIA TYPE: ICD-10-CM

## 2018-04-19 PROCEDURE — 99214 OFFICE O/P EST MOD 30 MIN: CPT | Mod: S$GLB,,, | Performed by: FAMILY MEDICINE

## 2018-04-19 PROCEDURE — 99999 PR PBB SHADOW E&M-EST. PATIENT-LVL V: CPT | Mod: PBBFAC,,, | Performed by: FAMILY MEDICINE

## 2018-04-19 PROCEDURE — 3077F SYST BP >= 140 MM HG: CPT | Mod: CPTII,S$GLB,, | Performed by: FAMILY MEDICINE

## 2018-04-19 PROCEDURE — 3078F DIAST BP <80 MM HG: CPT | Mod: CPTII,S$GLB,, | Performed by: FAMILY MEDICINE

## 2018-04-19 RX ORDER — SIMVASTATIN 20 MG/1
20 TABLET, FILM COATED ORAL NIGHTLY
Qty: 90 TABLET | Refills: 0 | Status: SHIPPED | OUTPATIENT
Start: 2018-04-19 | End: 2018-06-18

## 2018-04-19 RX ORDER — AMOXICILLIN 500 MG/1
500 CAPSULE ORAL EVERY 12 HOURS
Qty: 14 CAPSULE | Refills: 0 | Status: SHIPPED | OUTPATIENT
Start: 2018-04-19 | End: 2018-04-26

## 2018-04-19 NOTE — PATIENT INSTRUCTIONS
Tips to Control Acid Reflux    To control acid reflux, youll need to make some basic diet and lifestyle changes. The simple steps outlined below may be all youll need to ease discomfort.  Watch what you eat  · Avoid fatty foods and spicy foods.  · Eat fewer acidic foods, such as citrus and tomato-based foods. These can increase symptoms.  · Limit drinking alcohol, caffeine, and fizzy beverages. All increase acid reflux.  · Try limiting chocolate, peppermint, and spearmint. These can worsen acid reflux in some people.  Watch when you eat  · Avoid lying down for 3 hours after eating.  · Do not snack before going to bed.  Raise your head  Raising your head and upper body by 4 to 6 inches helps limit reflux when youre lying down. Put blocks under the head of your bed frame to raise it.  Other changes  · Lose weight, if you need to  · Dont exercise near bedtime  · Avoid tight-fitting clothes  · Limit aspirin and ibuprofen  · Stop smoking   Date Last Reviewed: 7/1/2016  © 7657-1095 [employer], [employer]. [address] [place] [address], [place] [address]. All rights reserved. This information is not intended as a substitute for professional medical care. Always follow your healthcare professional's instructions.

## 2018-04-19 NOTE — PROGRESS NOTES
"Subjective:       Patient ID: Sulema Burdick is a 73 y.o. female.    Chief Complaint: Follow-up (recheck BP) and Hypertension    Sulema is a 73 y.o. female who presents today for BP follow up. She is taking her spironolactone, 4 times a week. She missed two appointments with her renal physician. She has not taken the aldactone yet today. She was told to take the aldactone BID daily, but only takes it once daily because of a side effect of mucous production. Her compromise is to take the aldactone, daily, on days when she doesn't have to go to [employer]. She has been on blood pressure medication for years. She states that it hasn't been well controlled for a long time. She was found to be hypokalemic on previous lab work. When her pressures are high, she does have some headaches.   GERD: on zantac. Seems to help, but she 'likes spicy food"  Cough: she states that the cough that she is having is slowly improving. She states that she has had a cold for two months. The cough is productive, white sputum. No fevers or chills. She takes zyrtec and flonase and this helps a little.   Leg Pain: she has leg pain that seems to start on her left piriformis and goes down her leg. This got better with therapy in the past but has worsened again.       Hypertension   This is a chronic problem. The current episode started more than 1 year ago. The problem has been waxing and waning since onset. The problem is uncontrolled. Pertinent negatives include no blurred vision, chest pain, headaches, orthopnea, palpitations, peripheral edema or shortness of breath. There are no associated agents to hypertension. Risk factors for coronary artery disease include dyslipidemia and obesity. Past treatments include diuretics and calcium channel blockers. The current treatment provides mild improvement. Compliance problems include psychosocial issues and medication side effects.  Hypertensive end-organ damage includes kidney disease.     Review of " Systems   Constitutional: Negative for chills and fever.   HENT: Positive for congestion, postnasal drip and rhinorrhea.    Eyes: Negative for blurred vision.   Respiratory: Positive for cough. Negative for chest tightness and shortness of breath.    Cardiovascular: Negative for chest pain, palpitations and orthopnea.   Gastrointestinal: Negative for constipation, diarrhea and vomiting.   Genitourinary: Negative for difficulty urinating and dyspareunia.   Neurological: Negative for headaches.         Results for orders placed or performed in visit on 10/20/17   Microalbumin/creatinine urine ratio   Result Value Ref Range    Microalbum.,U,Random 79.0 ug/mL    Creatinine, Random Ur 51.0 15.0 - 325.0 mg/dL    Microalb Creat Ratio 154.9 (H) 0.0 - 30.0 ug/mg       Objective:     Vitals:    04/19/18 1040   BP: (!) 158/70   Pulse: 67        Physical Exam   Constitutional: She appears well-developed and well-nourished.   HENT:   Head: Normocephalic and atraumatic.   TM appear normal BL  Severe nasal congestion  Post nasal drip and cobblestoning noted  No adenopathy  Mild TTP of right frontal sinus   Eyes: Conjunctivae are normal.   Neck: Normal range of motion. Neck supple.   Cardiovascular: Normal rate and regular rhythm.    Pulmonary/Chest: No respiratory distress. She has no wheezes.   Her lungs appear clear to auscultation   Abdominal: Soft.   Musculoskeletal:   There is TTP of the left piriformis muscle   Neurological: She is alert.   Skin: Skin is warm.   Psychiatric: She has a normal mood and affect. Her behavior is normal. Judgment and thought content normal.   Nursing note and vitals reviewed.      Assessment:       1. Essential hypertension    2. Hyperlipidemia, unspecified hyperlipidemia type    3. CKD (chronic kidney disease), stage III    4. Hypokalemia    5. Microalbuminuria    6. Elevated fasting glucose    7. Statin intolerance    8. Gastroesophageal reflux disease, esophagitis presence not specified    9.  "Chronic cough    10. Sciatica of left side    11. Chronic sinusitis, unspecified location        Plan:       HTN:  resistant BP and low potassium, it's possible that patient has hyperaldosteronism and this is being treated with the spironolactone. Patient reports that the medicine "makes her spit" which she really dislikes to do at [employer]. She is agreeable to taking the medicine M-F when she does not go to [employer]. Renal referral and US kidney along with prot/cr ratio ordered    DLD/Statin Intolerance: she is having leg cramps, even on atorvastatin 10 mg. I have changed her to simvastatin 20 mg to see if her symptoms would improve. Check lipid panel today.     CKD: renal consult. Control Bp. Avoid NSAIDS (discussed again with patient). Tylenol for pain control. Try to decrease red meat intake.     Cough/Sinusitis: given length of symptoms, CXR. Questionable sinusitis? Will give abx and re-assess symptoms. Continue flonase and zyrtec.     Labs and orders as below. Encouraged close follow up with renal.     Essential hypertension  -     Comprehensive metabolic panel; Future; Expected date: 04/19/2018  -     Ambulatory Referral to Nephrology  -     NURSING COMMUNICATION: Create [facility] Account  -     Hypertension Digital Medicine ([employer]) Enrollment Order  -     Hypertension Digital Medicine ([employer]): Assign Onboarding Questionnaires    Hyperlipidemia, unspecified hyperlipidemia type  -     Lipid panel; Future; Expected date: 04/19/2018  -     simvastatin (ZOCOR) 20 MG tablet; Take 1 tablet (20 mg total) by mouth every evening.  Dispense: 90 tablet; Refill: 0    CKD (chronic kidney disease), stage III  -     Comprehensive metabolic panel; Future; Expected date: 04/19/2018  -     Ambulatory Referral to Nephrology  -     Protein / creatinine ratio, urine; Future; Expected date: 04/19/2018  -     US Retroperitoneal Complete; Future; Expected date: 04/19/2018    Hypokalemia  -     Comprehensive metabolic panel; Future; " Expected date: 04/19/2018  -     Ambulatory Referral to Nephrology    Microalbuminuria  -     Ambulatory Referral to Nephrology  -     US Retroperitoneal Complete; Future; Expected date: 04/19/2018    Elevated fasting glucose    Statin intolerance  -     simvastatin (ZOCOR) 20 MG tablet; Take 1 tablet (20 mg total) by mouth every evening.  Dispense: 90 tablet; Refill: 0    Gastroesophageal reflux disease, esophagitis presence not specified  -     ranitidine (ZANTAC) 150 MG tablet; Take 1 tablet (150 mg total) by mouth 2 (two) times daily.  Dispense: 60 tablet; Refill: 11    Chronic cough  -     X-Ray Chest PA And Lateral; Future; Expected date: 04/19/2018  -     amoxicillin (AMOXIL) 500 MG capsule; Take 1 capsule (500 mg total) by mouth every 12 (twelve) hours.  Dispense: 14 capsule; Refill: 0    Sciatica of left side  -     Ambulatory Referral to Physical/Occupational Therapy    Chronic sinusitis, unspecified location  -     amoxicillin (AMOXIL) 500 MG capsule; Take 1 capsule (500 mg total) by mouth every 12 (twelve) hours.  Dispense: 14 capsule; Refill: 0    Warning signs discussed, patient to call with any further issues or worsening of symptoms.

## 2018-04-20 ENCOUNTER — TELEPHONE (OUTPATIENT)
Dept: FAMILY MEDICINE | Facility: CLINIC | Age: 73
End: 2018-04-20

## 2018-04-20 DIAGNOSIS — E87.6 HYPOKALEMIA: Primary | ICD-10-CM

## 2018-04-20 NOTE — TELEPHONE ENCOUNTER
"Following message received from referral coordinator:     Chantal Clementeandrew Brunner, DO             Good Morning,   I did offer the patient a couple of earlier appointments but she declined. I also offered to schedule the patient with [facility] in [place] with something sooner but patient lives in [place] and did not want to drive all the way out here. How should I proceed?        Staff called patient and she was "not available" according to her daughter who picked up the phone.     I called patient and left a message explaining that I am very concerned about her low potassium. I have ordered a repeat CMP and I advised her on the phone to have this test repeated on either Sunday or Monday. I explained that low potassium can lead to cardiac abnormalities. I believe that the potassium and her elevated BP may be related and I really want her to see renal. I advised her to call my office so we could discuss this further and to see renal as soon as we are able to get her an appointment.   "

## 2018-04-20 NOTE — TELEPHONE ENCOUNTER
Attempt to call patient. And her daughter state she's unavailable right now that she will get her to return call.

## 2018-04-20 NOTE — TELEPHONE ENCOUNTER
----- Message from Usman Brunner DO sent at 4/19/2018  2:32 PM CDT -----  Please call the patient regarding her normal result. The lungs are clear. There is no evidence of any infection or fluid.

## 2018-04-20 NOTE — TELEPHONE ENCOUNTER
Please call patient and let her know that I have reviewed her ED records. Please thank her for going to the Ed. Let her know that she needs to have a repeat CMP done, Sunday or Monday. I am trying to get her into a kidney doctor quicker, I have sent a message to our referral coordinator. Let her know that she needs to take her potassium replacement, given to her by the Ed.     I think that her hypokalemia and her Htn may be related.

## 2018-04-27 ENCOUNTER — TELEPHONE (OUTPATIENT)
Dept: FAMILY MEDICINE | Facility: CLINIC | Age: 73
End: 2018-04-27

## 2018-04-27 NOTE — TELEPHONE ENCOUNTER
----- Message from Usman Brunner DO sent at 4/25/2018  3:39 PM CDT -----  Please schedule follow up CMP to assess potassium. She has not responded to messages. Thanks. Please see my previous phone message also for any further info.     Usman

## 2018-04-27 NOTE — TELEPHONE ENCOUNTER
Called both number in patient chart unable to reach her.I left a message for patient to return my phone call.

## 2018-04-27 NOTE — TELEPHONE ENCOUNTER
Spoke with patient and she state that she would like to finish her Rx of the potassium first before she get any additional lab work. I advised patient that I will let  know and I will give her a call on Monday to let her know what  advised.Patient voiced understanding.

## 2018-05-09 DIAGNOSIS — I10 ESSENTIAL HYPERTENSION: ICD-10-CM

## 2018-05-09 RX ORDER — AMLODIPINE BESYLATE 10 MG/1
TABLET ORAL
Qty: 30 TABLET | Refills: 0 | Status: SHIPPED | OUTPATIENT
Start: 2018-05-09 | End: 2018-06-07 | Stop reason: SDUPTHER

## 2018-05-23 ENCOUNTER — TELEPHONE (OUTPATIENT)
Dept: FAMILY MEDICINE | Facility: CLINIC | Age: 73
End: 2018-05-23

## 2018-05-23 NOTE — TELEPHONE ENCOUNTER
I attempt to call patient on both phone number in chart no answer. I scheduled follow up labs on the same day as her appointment with nephrology.

## 2018-06-01 ENCOUNTER — TELEPHONE (OUTPATIENT)
Dept: FAMILY MEDICINE | Facility: CLINIC | Age: 73
End: 2018-06-01

## 2018-06-01 ENCOUNTER — OFFICE VISIT (OUTPATIENT)
Dept: NEPHROLOGY | Facility: CLINIC | Age: 73
End: 2018-06-01
Payer: MEDICARE

## 2018-06-01 ENCOUNTER — LAB VISIT (OUTPATIENT)
Dept: LAB | Facility: HOSPITAL | Age: 73
End: 2018-06-01
Attending: FAMILY MEDICINE
Payer: MEDICARE

## 2018-06-01 VITALS
DIASTOLIC BLOOD PRESSURE: 90 MMHG | WEIGHT: 192.88 LBS | OXYGEN SATURATION: 97 % | SYSTOLIC BLOOD PRESSURE: 158 MMHG | HEART RATE: 61 BPM | HEIGHT: 63 IN | BODY MASS INDEX: 34.18 KG/M2

## 2018-06-01 DIAGNOSIS — N18.30 CHRONIC KIDNEY DISEASE, STAGE III (MODERATE): ICD-10-CM

## 2018-06-01 DIAGNOSIS — E87.6 HYPOKALEMIA: ICD-10-CM

## 2018-06-01 DIAGNOSIS — E87.6 HYPOKALEMIA: Primary | ICD-10-CM

## 2018-06-01 LAB
ALBUMIN SERPL BCP-MCNC: 3.9 G/DL
ALP SERPL-CCNC: 87 U/L
ALT SERPL W/O P-5'-P-CCNC: 12 U/L
ANION GAP SERPL CALC-SCNC: 11 MMOL/L
AST SERPL-CCNC: 17 U/L
BILIRUB SERPL-MCNC: 0.5 MG/DL
BUN SERPL-MCNC: 32 MG/DL
CALCIUM SERPL-MCNC: 10.1 MG/DL
CHLORIDE SERPL-SCNC: 105 MMOL/L
CO2 SERPL-SCNC: 26 MMOL/L
CREAT SERPL-MCNC: 1.6 MG/DL
EST. GFR  (AFRICAN AMERICAN): 36.8 ML/MIN/1.73 M^2
EST. GFR  (NON AFRICAN AMERICAN): 32 ML/MIN/1.73 M^2
GLUCOSE SERPL-MCNC: 90 MG/DL
POTASSIUM SERPL-SCNC: 4.2 MMOL/L
PROT SERPL-MCNC: 7.7 G/DL
SODIUM SERPL-SCNC: 142 MMOL/L

## 2018-06-01 PROCEDURE — 36415 COLL VENOUS BLD VENIPUNCTURE: CPT

## 2018-06-01 PROCEDURE — 99999 PR PBB SHADOW E&M-EST. PATIENT-LVL III: CPT | Mod: PBBFAC,,, | Performed by: INTERNAL MEDICINE

## 2018-06-01 PROCEDURE — 3080F DIAST BP >= 90 MM HG: CPT | Mod: CPTII,S$GLB,, | Performed by: INTERNAL MEDICINE

## 2018-06-01 PROCEDURE — 99204 OFFICE O/P NEW MOD 45 MIN: CPT | Mod: S$GLB,,, | Performed by: INTERNAL MEDICINE

## 2018-06-01 PROCEDURE — 3077F SYST BP >= 140 MM HG: CPT | Mod: CPTII,S$GLB,, | Performed by: INTERNAL MEDICINE

## 2018-06-01 PROCEDURE — 80053 COMPREHEN METABOLIC PANEL: CPT

## 2018-06-01 NOTE — TELEPHONE ENCOUNTER
I spoke with patient and informed her of her lab potassium increased and kidney decreased. Patient roland to see Nephrologist today and state the Nephrologist will contact you about changing medication. Patient state she is no longer taking potassium supplement she was only giving a week worth. I advised patient that I'll send a message to . Patient voiced understanding.

## 2018-06-01 NOTE — LETTER
June 11, 2018      Usman Brunner, DO  [address] [facility] [address] [facility]  [place] [address]           [facility]  [address]  [facility] [address]  Phone: [phone]  Fax: [phone]          Patient: Sulema Burdick   MR Number: [id number]   YOB: 1945   Date of Visit: 6/1/2018       Dear Dr. Usman Brunner:    Thank you for referring Sulema Burdick to me for evaluation. Attached you will find relevant portions of my assessment and plan of care.    If you have questions, please do not hesitate to call me. I look forward to following Sulema Burdick along with you.    Sincerely,    Nguyễn Staffodr MD    Enclosure  CC:  No Recipients    If you would like to receive this communication electronically, please contact [email or web address] or [phone] to request more information on [employer] access.    For providers and/or their staff who would like to refer a patient to [facility], please contact us through our one-stop-shop provider referral line, [facility] , at [phone].    If you feel you have received this communication in error or would no longer like to receive these types of communications, please e-mail [email or web address]

## 2018-06-07 DIAGNOSIS — I10 ESSENTIAL HYPERTENSION: ICD-10-CM

## 2018-06-07 RX ORDER — AMLODIPINE BESYLATE 10 MG/1
TABLET ORAL
Qty: 30 TABLET | Refills: 0 | Status: SHIPPED | OUTPATIENT
Start: 2018-06-07 | End: 2018-07-05 | Stop reason: SDUPTHER

## 2018-06-11 NOTE — PROGRESS NOTES
Subjective:       Patient ID: Sulema Burdick is a 73 y.o. Black or  female who presents for new evaluation of Hypokalemia    HPI  Ms. Burdick is a 73 year old woman with medical history of hypertension, chronic kidney disease presenting for evaluation of hypokalemia.  Patient creatinine has been ~1.5mg/dL for the past year (previously 1.2-1.4mg/dL).  Patient variably adherent to her med regimen due to reported intolerance, therefore blood pressure also variable at home.  She denies any NSAID use, she reports adequate fluid intake.  She reports she had been on a low potassium diet (due to instruction from a CKD educator).  She otherwise denies any fever, chest pain, shortness of breath, abdominal pain, diarrhea, dysuria/hematuria.     Review of Systems   Constitutional: Negative for appetite change, fatigue and fever.   Respiratory: Negative for chest tightness and shortness of breath.    Cardiovascular: Negative for chest pain and leg swelling.   Gastrointestinal: Negative for abdominal pain, constipation, diarrhea, nausea and vomiting.   Genitourinary: Negative for difficulty urinating, dysuria, flank pain, frequency, hematuria and urgency.   Musculoskeletal: Negative for arthralgias, joint swelling and myalgias.   Skin: Negative for rash and wound.   Neurological: Negative for dizziness, weakness and light-headedness.   All other systems reviewed and are negative.      Objective:      Physical Exam   Constitutional: She appears well-developed and well-nourished.   Cardiovascular: Normal rate, regular rhythm and normal heart sounds.  Exam reveals no gallop and no friction rub.    No murmur heard.  Pulmonary/Chest: Effort normal and breath sounds normal. No respiratory distress. She has no wheezes. She has no rales.   Abdominal: Soft. Bowel sounds are normal. There is no tenderness.   Musculoskeletal: She exhibits no edema.   Neurological: She is alert.   Skin: Skin is warm and dry. No rash noted. No  erythema.   Psychiatric: She has a normal mood and affect.   Vitals reviewed.      Assessment:       1. Hypokalemia    2. Chronic kidney disease, stage III (moderate)        Plan:     Ms. Burdick is a 73 year old woman with medical history of hypertension, chronic kidney disease presenting for evaluation of hypokalemia.  Patient creatinine has been ~1.5mg/dL for the past year (previously 1.2-1.4mg/dL), consistent with CKD stage IIIb, likely due to hypertensive nephrosclerosis v. atherosclerotic disease.  Patient hypokalemia possibly due to hyperaldosteronism, must also consider due to HCTZ.  Given patient's reported intolerance to multiple meds, would hold HCTZ and consider initiating eplerenone in place of spironolactone to see if patient tolerates better.  Will monitor closely for tolerance in addition to BP diary, further recommendations pending results.      Return to clinic in 2- 3 months pending CMP with renal/heme panel, iron/TIBC/ferritin, urinalysis/culture, urine protein/creatinine ratio, PTH/VitD prior to next visit

## 2018-06-18 ENCOUNTER — OFFICE VISIT (OUTPATIENT)
Dept: CARDIOLOGY | Facility: CLINIC | Age: 73
End: 2018-06-18
Payer: MEDICARE

## 2018-06-18 VITALS
DIASTOLIC BLOOD PRESSURE: 94 MMHG | BODY MASS INDEX: 32.78 KG/M2 | SYSTOLIC BLOOD PRESSURE: 185 MMHG | WEIGHT: 192 LBS | HEIGHT: 64 IN | HEART RATE: 78 BPM

## 2018-06-18 DIAGNOSIS — I51.7 LEFT VENTRICULAR HYPERTROPHY: ICD-10-CM

## 2018-06-18 DIAGNOSIS — I51.89 DIASTOLIC DYSFUNCTION: ICD-10-CM

## 2018-06-18 DIAGNOSIS — I10 ESSENTIAL HYPERTENSION: Primary | ICD-10-CM

## 2018-06-18 DIAGNOSIS — E78.2 MIXED HYPERLIPIDEMIA: ICD-10-CM

## 2018-06-18 DIAGNOSIS — N18.30 CKD (CHRONIC KIDNEY DISEASE), STAGE III: ICD-10-CM

## 2018-06-18 DIAGNOSIS — I15.0 RENOVASCULAR HYPERTENSION: ICD-10-CM

## 2018-06-18 PROCEDURE — 99215 OFFICE O/P EST HI 40 MIN: CPT | Mod: S$GLB,,, | Performed by: INTERNAL MEDICINE

## 2018-06-18 PROCEDURE — 3080F DIAST BP >= 90 MM HG: CPT | Mod: CPTII,S$GLB,, | Performed by: INTERNAL MEDICINE

## 2018-06-18 PROCEDURE — 99999 PR PBB SHADOW E&M-EST. PATIENT-LVL III: CPT | Mod: PBBFAC,,, | Performed by: INTERNAL MEDICINE

## 2018-06-18 PROCEDURE — 93000 ELECTROCARDIOGRAM COMPLETE: CPT | Mod: S$GLB,,, | Performed by: INTERNAL MEDICINE

## 2018-06-18 PROCEDURE — 3077F SYST BP >= 140 MM HG: CPT | Mod: CPTII,S$GLB,, | Performed by: INTERNAL MEDICINE

## 2018-06-18 RX ORDER — POTASSIUM CHLORIDE 750 MG/1
10 CAPSULE, EXTENDED RELEASE ORAL ONCE
COMMUNITY
End: 2018-06-18

## 2018-06-18 RX ORDER — NITROGLYCERIN 0.4 MG/1
0.4 TABLET SUBLINGUAL EVERY 5 MIN PRN
Qty: 20 TABLET | Refills: 12 | Status: SHIPPED | OUTPATIENT
Start: 2018-06-18 | End: 2018-10-25

## 2018-06-18 RX ORDER — ROSUVASTATIN CALCIUM 20 MG/1
20 TABLET, COATED ORAL NIGHTLY
Qty: 90 TABLET | Refills: 3 | Status: SHIPPED | OUTPATIENT
Start: 2018-06-18

## 2018-06-18 RX ORDER — HYDROCHLOROTHIAZIDE 25 MG/1
25 TABLET ORAL DAILY
Qty: 90 TABLET | Refills: 3 | Status: SHIPPED | OUTPATIENT
Start: 2018-06-18 | End: 2019-06-23 | Stop reason: SDUPTHER

## 2018-06-18 NOTE — LETTER
June 18, 2018      Usman Brunner, DO  [address] [facility]  [place] [address]           [facility]  [address], [address]  [facility] [address]  Phone: [phone]          Patient: Sulema Burdick   MR Number: [id number]   YOB: 1945   Date of Visit: 6/18/2018       Dear Dr. Usman Brunner:    Thank you for referring Sulema Burdick to me for evaluation. Attached you will find relevant portions of my assessment and plan of care.    If you have questions, please do not hesitate to call me. I look forward to following Sulema Burdick along with you.    Sincerely,    Eduardo Jones MD    Enclosure  CC:  No Recipients    If you would like to receive this communication electronically, please contact [email or web address] or [phone] to request more information on [employer] Link access.    For providers and/or their staff who would like to refer a patient to [facility], please contact us through our one-stop-shop provider referral line, [facility], at [phone].    If you feel you have received this communication in error or would no longer like to receive these types of communications, please e-mail [email or web address]

## 2018-06-18 NOTE — PATIENT INSTRUCTIONS
Heart Disease Education    The heart beats 60 to 100 times per minute, 24 hours a day. This equals almost 1000,000 times a day. It pumps blood with oxygen and nutrients to the tissues and organs of the body. But the heart is a muscle and needs its own supply of blood. Blood flow to the heart is supplied by the coronary arteries. Coronary artery disease (atherosclerosis) is a result of cholesterol, saturated fat, and calcium deposits (plaques) that build up inside the walls. This causes inflammation within the coronary arteries. These plaques narrow the artery and reduce blood flow to the heart muscle. The reduction in blood flow to the heart muscle decreases oxygen supply to the heart. If the narrowing is significant enough, the oxygen supply to one or more regions of the heart can be temporarily or permanently shut down. This can cause chest pain, and possibly death of heart tissue (heart attack).  Types of chest pain  Angina is the name for pain in the heart muscle. Angina is a warning sign of serious heart disease. When untreated it can lead to a heart attack, also known as acute myocardial infarction, or AMI. Angina occurs when there is not enough blood and oxygen flowing to the heart for the amount of work it is doing. This most often happens during physical exertion, when the heart is working hardest. It is usually relieved by rest or nitroglycerin. Angina may also occur after a large meal when extra blood is sent to the digestive organs and less goes to the heart. In the case of advanced or unstable heart disease, angina can occur at rest or awaken you from sleep. Angina usually lasts from a few minutes up to 20 minutes or more. When treated early, the effects of angina can be reversed without permanent damage to the heart. Angina is a serious condition and needs to be evaluated by a medical professional immediately.  There are two types of angina -- stable and unstable:  · Stable angina usually occurs  with a predictable level of activity. Being stable, its character, severity, and occurrence do not change much over time. It usually starts with activity, and resolves with rest or taking your medicine as instructed by your doctor. The symptoms usually do not last long.  · Unstable angina changes or gets worse over time. It is different from whatever you are used to. It may feel different or worse, begin without cause, occur with exercise or exertion, wake you up from sleep, and last longer. It may not respond in the same way as it does when you take your usual medicines for an attack. This type of angina can be a warning sign of an impending heart attack.     A heart attack is usually the result of a blood clot that suddenly forms in a coronary artery that has been narrowed with plaque. When this occurs, blood flow may be cut off to a part of the heart muscle, causing the cells to die. This weakens the pumping action of the heart, which affects the delivery of blood to all the other organs in the body including the brain. This damage is not reversible. However, early treatment can limit the amount of damage.  The pain you feel with angina and a heart attack may have a similar quality. However, it is usually different in intensity and duration. Here are some typical descriptions of a heart attack:  · It is most often experienced as a squeezing, crushing, pressure-like sensation in the center of the chest.  · It is sometimes described as something heavy sitting on my chest.  · It may feel more like a bad case of indigestion.  · The pain may spread from the chest to the arm, shoulder, throat or jaw.  · Sometimes the pain is not felt in the chest at all, but only in the arm, shoulder, throat or jaw.  · There may also be nausea, vomiting, dizziness or light-headedness, sweating and trouble breathing.  · Palpitations, or your heart beating rapidly  · A new, irregular heart beat  · Unexplained weakness  You may not be  "able to tell the difference between "bad" angina and a heart attack at home. Seek help if your symptoms are different than usual. Do not be in denial or just try to "tough it out."  Call 911  This is the fastest and safest way to get to the emergency department. The paramedics can also start treatment on the way to the hospital, saving valuable time for your heart.  · If the angina gets worse, if it continues, or if it stops and returns, call 911 immediately. Do not delay. You may be having a heart attack.  · After you call 911, take a second tablet or spray unless instructed otherwise. When repeating doses, sit down if possible, because it can make you feel lightheaded or dizzy. Wait another 5 minutes. If the angina still does not go away, take a third tablet or spray. Do not take more than 3 tablets or sprays within 15 minutes. Stay on the phone with 911 for further instruction.  · Your healthcare provider may give you slightly different instructions than those above. If so, follow them carefully.  Do not wait until symptoms become severe to call 911.  Other reasons to call 911 include:  · Trouble breathing  · Feeling lightheaded, faint, or dizzy  · Rapid heart beat  · Slower than usual heart rate compared to your normal  · Angina with weakness, dizziness, fainting, heavy sweating, nausea, or vomiting  · Extreme drowsiness, confusion  · Weakness of an arm or leg or one side of the face  · Difficulty with speech or vision  When to seek medical care  Remember, the signs and symptoms of a heart attack are not always like they are on TV. Sometimes they are not so obvious. You may only feel weak, or just not right. If it is not clear or if you have any doubt, call for advice.  · Seek help if there is a change in the type of pain, if it feels different, or if your symptoms are mild.  · Do not drive yourself. Have someone else drive you. If no one can drive, call 911.  · Do not delay. Fast diagnosis and treatment can " "prevent or limit the amount of heart damage during a heart attack.  · Do not go to your doctor's office or a clinic as they may not be able to provide all the testing and treatment required for this condition.  · If your doctor has given you medicine to take when symptoms occur, take them but don't delay getting help trying to locate medicines.  What happens in the emergency department  The emergency department is connected to your local emergency medical system (EMS) through 911. That's why during a cardiac emergency, calling 911 is the fastest way to get help. The goal of the emergency department is to rapidly screen, evaluate, and treat people.  Once you are there, an electrocardiogram (ECG or heart tracing) will be done. Blood samples may be taken to look for the presence of heart enzymes that leak from damaged heart cells and show if a heart attack is occurring. You will often be evaluated by a heart specialist (cardiologist) who decides the best course of action. In the case of severe angina or early heart attack, and depending on the circumstances, powerful "clot busting" medicines can be used to dissolve blood clots in the coronary artery. In other cases, you may be taken to a cardiac catheterization lab. Here, a tiny balloon-tipped catheter is advanced through blood vessels to the heart. There the balloon is inflated pushing open the blood vessel restoring blood flow.  Risk factors for heart disease  Risk factors for heart disease are a combination of genetic and lifestyle. Many risk factors work by either directly or indirectly damaging the blood vessels of the heart, or by increasing the risk of forming blood or cholesterol clots, which then clog up and block the arteries.     Examples of physical lifestyle risk factors:  · Cigarette smoking  · High blood pressure  · High blood cholesterol  · Use of stimulant drugs such as cocaine, crack, and amphetamines  · Eating a high-fat, high-cholesterol " meal  · Diabetes   · Obesity which increases risk for diabetes and high blood pressure  · Lack of regular physical activity     Examples of emotional lifestyle factors:  · Chronic high stress levels release stress hormones. These raise blood pressure and cholesterol level and makes blood clot more easily.  · Held-in anger, hostile or cynical attitude  · Social and emotional isolation, lack of intimacy  · Loss of relationship  · Depression  Other factors that increase the risk of heart attack that you cannot control :  · Age. The older you get beyond 40, the greater is your risk of significant coronary artery disease.  · Gender. More men than women get heart disease; but once past menopause, women who are not taking estrogen replacement have the same risk as men for a heart attack.  · Family history. If your mother, father, brother or sister has coronary artery disease, your risk of having it is higher than a person your age without this family history.  What can you do to decrease your risk  To reduce your risk of heart disease:  · Get regular checkups with your doctor.  · Take your medicines for blood pressure, cholesterol or diabetes as directed.  · Watch your diet. Eat a heart healthy diet choosing fresh foods, less salt, cholesterol, and fat  · Stop smoking. Get help if needed.  · Get regular exercise.  · Manage stress.  · Carry a list of medicines and doses in your wallet.  Date Last Reviewed: 12/30/2015  © 6611-5647 [employer]. [address] [place] [address], [place] [address]. All rights reserved. This information is not intended as a substitute for professional medical care. Always follow your healthcare professional's instructions.

## 2018-06-18 NOTE — PROGRESS NOTES
Subjective:   Patient ID:  Sulema Burdick is a 73 y.o. female who presents for follow up of Hypertension; Hyperlipidemia; and LVH + diastolic dysfunction      HPI:     Sulema Burdick 73 y.o. female is here follow up and feeling well without any new complaints. She has not been in the cardiology clinic for 3 years. She is doing well without any cardiac complaints or limitations. Her BP is elevated. She is tolerating her medications well. She trying to eat well and remain active.         ECG: sinus bradycardia with non specific ST abnormalities      Echo 2012:     Mild LAE   Normal EF   Trivial AI/MR/CA   PASP 30 mmHg        Bun/cr 32/1.6 gfr 37           Patient Active Problem List    Diagnosis Date Noted    Primary osteoarthritis of both knees 10/12/2015    CKD (chronic kidney disease), stage III 2015    BMI 34.0-34.9,adult 2015    Nuclear sclerosis - Both Eyes 2013    Cervicogenic headache 05/10/2013    Renovascular hypertension 05/10/2013    Left ventricular hypertrophy 05/10/2013    Diastolic dysfunction 05/10/2013    Trochanteric bursitis 05/10/2013    Vitamin D deficiency disease 05/10/2013    Disorder of bone and cartilage 05/10/2013    Degenerative joint disease 05/10/2013    Obstructive sleep apnea on CPAP 05/10/2013    Dry eye syndrome 05/10/2013    Chronic asthma 05/10/2013    History of hepatitis 05/10/2013    Family history of breast cancer in first degree relative 05/10/2013    Family history of colon cancer 05/10/2013    Gastroesophageal reflux disease 05/10/2013    Obesity 2012    Hypertension     Hyperlipidemia     Arthritis            Right Arm BP - Sittin/90  Left Arm BP - Sittin/90        LABS    LAST HbA1c  Lab Results   Component Value Date    HGBA1C 5.7 (H) 10/20/2017       Lipid panel  Lab Results   Component Value Date    CHOL 217 (H) 2018    CHOL 258 (H) 10/20/2017    CHOL 259 (H) 2015     Lab Results   Component Value Date     HDL 47 04/19/2018    HDL 44 10/20/2017    HDL 42 09/09/2015     Lab Results   Component Value Date    LDLCALC 153 (H) 04/19/2018    LDLCALC 183.8 (H) 10/20/2017    LDLCALC 174.8 (H) 09/09/2015     Lab Results   Component Value Date    TRIG 87 04/19/2018    TRIG 151 (H) 10/20/2017    TRIG 211 (H) 09/09/2015     Lab Results   Component Value Date    CHOLHDL 21.7 04/19/2018    CHOLHDL 17.1 (L) 10/20/2017    CHOLHDL 16.2 (L) 09/09/2015            ROS    Objective:   Physical Exam    Assessment:     1. Essential hypertension    2. Mixed hyperlipidemia    3. Renovascular hypertension    4. Left ventricular hypertrophy    5. Diastolic dysfunction    6. CKD (chronic kidney disease), stage III        Plan:           Continue with aldactone + hctz + norvasc  Enroll in HTN clinic  Target BP < 130/80 mmHg  She can tolerate beta-blocker with her asymptomatic bradycardia        Stop zocor  Start crestor 20 mg nightly and titrate to 40 mg if needed        Weight loss  Exercise  Low salt diet      Follow up with PCP + nephrology + digital HTN clinic per protocol           Continue with current medical plan and lifestyle changes.  Return sooner for concerns or questions. If symptoms persist go to the ED  I have reviewed all pertinent data on this patient       I have reviewed the patient's medical history in detail and updated the computerized patient record.    Orders Placed This Encounter   Procedures    NURSING COMMUNICATION: Create [facility]    Hypertension Digital Medicine (HDMP) Enrollment Order     I. PURPOSE  To provide [facility] patients with innovative, specialized blood pressure monitoring and optimal dosing of antihypertensive therapy to improve health outcomes and decrease microvascular and macrovascular complications    II. GOALS  To maintain a systematic, coordinated and cost-effective process to monitor blood pressure in patients with hypertension  To attain and maintain optimal  antihypertensive therapy while ensuring patient safety using the most recent evidence-based guidelines for the management of hypertension as reported by AHA/ACC in 2017.   Provide consultative services to providers, patients and caregivers regarding optimal antihypertensive therapy  To improve patient/caregiver understanding and compliance related to antihypertensive therapies by providing continuous patient and caregiver education about their prescribed medications and associated disease state  To provide education, guidance and reinforcement regarding lifestyle modifications including weight loss, adopting and maintaining the Dietary Approaches to Stop Hypertension or DASH diet, sodium restriction, physical activity, and moderation of alcohol consumption to patients and caregivers  Allow providers increased availability of clinic time for direct patient care   To provide patient care and education within an interdisciplinary framework and enhance partnering with other members of health care team  Improve continuity of care for high blood pressure patients and improve patient engagement  Collect and utilize pharmacy related outcomes to improve quality of patient care    III. COLLABORATIVE PRACTICE AGREEMENT    A.  Under this collaborative practice agreement, an [facility] pharmacist according to and in compliance with [place] Board of Pharmacy Title 46, Part LIII, section 523 and [place] Board of Medical Examiners definition of the Collaborative Drug Therapy Management (CDTM) may initiate, implement, alter and monitor a therapeutic drug plan intended to manage antihypertensive therapy.  Services offered by the hypertension pharmacy specialist may include education on disease state and lifestyle modification, in addition to the drug therapy services listed above. Written and audio/visual educational materials and patient specific information may be provided to improve quality of care.    B. Primary Collaborating  Physician:    Primary Physician: Jonathan Mcginnis M.D., [facility], [place]  , Cardiology  License number: [id number]  CDTM number:  [id number]  Telephone number: [phone]   E-mail address: [email or web address]  Emergency Contact Information: [phone]    Back-Up Physician:  Rajesh Kennedy M.D.  Cardiology  License number:  [id number]  CDTM number:  [id number]  Telephone number:  [phone]  E-mail address: [email or web address]  Emergency Contact Information: [phone]    C.  Pharmacists:   Each of the following pharmacists will serve as the primary pharmacist on CDTM and any pharmacist may serve as the secondary pharmacist for another pharmacists agreement.  Each of the pharmacists listed below has a Pharm.D degree, with completion of an accredited pharmacy practice residency and as well as experience with managing patients along with a physician.  The outpatient monitoring service will be provided under the Cardiology Department at [facility] [place] location in [place] at [address] [place] [address]. The pharmacist will contact patients via telephone from a remote location.    Pharmacist: Antoinette Salmon, Jeff  This pharmacist has completed a pharmacy practice residency and has practiced clinical pharmacy for 7 years. She has experience in the areas of cardiology, acute care, and managed care. She started a pharmacist run hypertension clinic at [facility] during her residency and was published in The American Journal of Health-System Pharmacists.     [place] Pharmacist License Number: [id number]  CDTM number:  [id number]  Contact Number:  [phone]  Contact E-mail: [email or web address]  Emergency Contact Number:  [phone]    Pharmacist:  Sapna So PharmD  This pharmacist has completed a pharmacy practice residency and has practiced clinical pharmacy for 17 years in the areas of cardiology, geriatric medicine,  anticoagulation management, retail and ambulatory care.  She served as a pharmacy practice clinical preceptor and lead pharmacist in anticoagulation clinic for 10 years.  [place] Pharmacist License # [id number]  CDTM number:  [id number]  Contact Number:  [phone]  Contact E-mail:  [email or web address]   Emergency Contact Number:  [phone]    Pharmacist: Abbey Perez PharmD, BCACP, CDE  This pharmacist has completed a pharmacy practice residency with emphasis in ambulatory care and has practiced clinical pharmacy for 8 years in the areas of dyslipidemia, hypertension, diabetes, and anticoagulation. She is also a Certified Diabetes Educator.      [place] Pharmacist License # [id number]  CDTM number: [id number]  Contact number: [phone]  Contact E-mail:  [email or web address]  Emergency Contact Number: [phone]    Pharmacist: Ayaka Hart PharmD  This pharmacist started practicing at [facility] in 2011 following her one year residency at [facility]. She serves as an Adjunct Clinical  in the College of Pharmacy at [facility] [facility]. She served as the lead pharmacist for the Coumadin Clinic team for 4 years.   [place] Pharmacist License: [id number]  CDTM number: [id number]  Contact number: [phone]  Contact E-mail: [email or web address]   Emergency Contact Number: [phone]    Pharmacist:  Doris Davis PharmD  This pharmacist has completed a pharmacy practice residency (PGY-1) and has practiced clinical pharmacy for 16  years in the areas of heart and abdominal transplant, retail and ambulatory care.  She was directly involved in the care of congestive heart failure, left ventricular assist device and heart transplant patients from 2805-3729.  She is currently practicing as a digital medicine clinical specialist in heart failure.    [place] Pharmacist License # [id number]  CDTM number:  [id number]  Contact Number:  [phone]  Contact  E-mail:  [email or web address]   Emergency Contact Number:  [phone]    Pharmacist: Ayaka Cavanaugh PharmD  This pharmacist obtained her Pharm.D. from [employer], and has completed an Ambulatory Care Pharmacy Practice residency at [facility]. She has practiced clinical pharmacy for 9  years and has experience in the areas of Hypertension and Diabetes.      [place] Pharmacist License: [id number]  CDTM Number: [id number]  Telephone number: [phone]  E-mail: [email or web address]  Emergency Contact Number: [phone]      Pharmacist: Jil Agee PharmD  This pharmacist has completed a pharmacy practice residency with an emphasis in ambulatory care and has practiced clinical pharmacy for one year. She has experience in the areas of diabetes, hypertension and dyslipidemia.   [place] Pharmacist License: [id number]  CDTM Number: [id number]  Contact Number: [phone]  Contact Email: [email or web address]   Emergency Contact: [phone]    Pharmacist: Milla Peoples PharmD, BCPS  This pharmacist has completed a pharmacy practice residency with an emphasis in ambulatory care and is a Board Certified Pharmacotherapy Specialist (BCPS). She has practiced clinical pharmacy for  years in areas of ambulatory care, internal medicine, cardiology and specialty pharmacy.    [place] Pharmacist License Number: [id number]  CDTM number:  [id number]  Contact Number:  [phone]  Contact E-mail:  [email or web address]   Emergency Contact Number:  [phone] d.  Eligible Patients  Patients whose antihypertensive therapy is managed under this agreement must have a diagnosis of hypertension as documented in the patient record, and have established care with a provider within [facility]. All aspects of the patients hypertension medication management will be followed in collaboration with the physician treating the patients hypertension.   The patient will be seen by his or her physician per their discretion or by the recommendation by the hypertension pharmacist.  The patients case may be reviewed with clinic medical personnel as needed, but will be reported at least every 30 days to the collaborating physician regarding the patients drug therapy management. All decisions made by the pharmacist will be recorded in [facility] and are readily available for physician review. All issues outside of the scope of antihypertensive therapy shall be reported to the collaborating physician.     E.  Medications in CDTM  This collaborative practice proposal involves the management of patients who are receiving antihypertensive therapy, specifically, thiazide-type diuretics, angiotensin-converting enzyme inhibtors (ACE-I), angiotensin receptor blockers (ARB), calcium channel blockers (CCB), beta-blockers, loop diuretics, potassium-sparing diuretics, potassium supplementation, aldosterone antagonists, direct renin inhibitors, alpha-1 receptor blockers, central alpha-2 agonists, direct arterial vasodilators and peripheral adrenergic antoagonists, or those patients in which hypertension is controlled by lifestyle modifcation alone.      F.  Clinical Procedure  All patients will be notified that a hypertension CDTM exists.  A signed physician order will be required for each patient to be enrolled into the hypertension CDTM.  Informed consent and an electronic signature will be obtained from each patient and documented in the patients record.  This patient signature will be valid for 1 year, requiring a new physician order and patient consent yearly.  The patient must also be enrolled in the electornic communication program ([facility]) to be elegible for the monitoring program.  The following management plan will be utilized for CDTM:    Patients must submit blood pressures at a minimum of once weekly from the patient- purchased wireless blood pressure cuff.  Patients who fail to comply with this requirement are subject to removal from [facility].   Evaluate the change in blood pressure, if any, from previous measurements performed on the same cuff and arm.  Determine and document contributing factors for blood pressure change.    Review initial drug therapy made by clinic physician upon program enrollment with patient to ensure compliance.    Identify target blood pressure based on age and comorbidities. Therapeutic changes will be made in collaboration with PharmD and collaborating physician based on the AHA/ACC 2017 guidelines for the treatment of hypertension.  Guide drug optimization based on patient response at 2-4 week intervals until control is achieved.  The PharmD will continue to monitor blood pressure and make adjustments to hypertension medications in order to achieve optimal blood pressure.   Order follow up labs when changing or adding ACE inhibitors and diuretics.    Refer to hypertension specialist if  blood pressure goals cannot be achieved using the noted algorithm.  Notify physician with specific problems or request clinic visit if deemed necessary.  Document antihypertensive therapy changes, interventions, and outcomes in EMR.   Provide patient/caregiver continuous education or reinforcement regarding hypertension management,  medications and lifestyle modifications.    Laboratory Tests  Pharmacists will be authorized to order and evaluate laboratory tests directly related to the disease specific drug therapy being managed. Pharmacists will also be authorized to order additional specific labs based on patient clinical presentation or description. Pharmacists may also review other lab data available in the patient record which may be necessary for the evaluation and assessment of the impact on antihypertensive therapy (i.e. drug interaction, disease interaction, etc.).     b.  Documentation   Documentation of patient encounters and lab results will be  permanently placed in the [facility].  Laboratory results will automatically populate prompting review and assessment of each patient.  Laboratory results obtained from outside laboratories will be entered into EMR manually.  This documentation will include lab values, medication changes, identification and assessment of adverse events related to therapy, antihypertensive medication dose adjustments, therapeutic management plan and follow up as well as any other information given to the patient during the telemedicine visit.    c.     1.   will be carried out through quarterly reports tracking following statistics:   a. Percent of patients requiring a change to antihypertensive medications   b. Number of emergency visits due to hypertensive urgency or emergency, hypotension or medication side effects for participating patients  Percent of patients who are controlled (BP <130/80 mmHg)  and uncontrolled (BP>130 mmHg)     2.  Patient specific quality indicators will be identified through quarterly review of the following data:   a.  Average change in blood pressure after a dose adjument by the hypertension pharmacist    3.  A random sample of patient records shall be reviewed by the primary physician quarterly to ensure adherence by the hypertension clinical specialists to the collaborative practice agreement.     4.   measures will be reported to Pharmacy & Therapeutics Committee yearly.     References:    PADMINI Gilman, et al. 2017. 2017. Guidelines for the Prevention, Detection, Evaluation and Management of High Blood Pressure in Adults.      Order Specific Question:   BP Control Goal     Answer:   Current (2017) AHA Guidelines    IN OFFICE EKG 12-LEAD (to Muse)     Order Specific Question:   Diagnosis     Answer:   HTN (hypertension) [502594]       Follow up as scheduled. Return sooner for concerns or questions            She expressed verbal understanding and  agreed with the plan        Patient's Medications   New Prescriptions    NITROGLYCERIN (NITROSTAT) 0.4 MG SL TABLET    Place 1 tablet (0.4 mg total) under the tongue every 5 (five) minutes as needed for Chest pain.    ROSUVASTATIN (CRESTOR) 20 MG TABLET    Take 1 tablet (20 mg total) by mouth every evening. Generic allowed   Previous Medications    AMLODIPINE (NORVASC) 10 MG TABLET    TAKE 1 TABLET(10 MG) BY MOUTH EVERY DAY    BUTALBITAL-ACETAMINOPHEN-CAFFEINE -40 MG (FIORICET, ESGIC) -40 MG PER TABLET    Take 1 tablet by mouth every 6 (six) hours as needed for Pain.    CETIRIZINE (ZYRTEC) 10 MG TABLET    Take 1 tablet (10 mg total) by mouth once daily.    ERGOCALCIFEROL (ERGOCALCIFEROL) 50,000 UNIT CAP    TAKE ONE CAPSULE BY MOUTH EVERY WEEK.    FLUTICASONE (FLONASE) 50 MCG/ACTUATION NASAL SPRAY    2 sprays by Each Nare route once daily.    NITROGLYCERIN (NITROSTAT) 0.4 MG SL TABLET    Place 1 tablet (0.4 mg total) under the tongue every 5 (five) minutes as needed for Chest pain.    RANITIDINE (ZANTAC) 150 MG TABLET    Take 1 tablet (150 mg total) by mouth 2 (two) times daily.    SPIRONOLACTONE (ALDACTONE) 25 MG TABLET    Take 1 tablet (25 mg total) by mouth once daily.   Modified Medications    Modified Medication Previous Medication    HYDROCHLOROTHIAZIDE (HYDRODIURIL) 25 MG TABLET hydroCHLOROthiazide (HYDRODIURIL) 25 MG tablet       Take 1 tablet (25 mg total) by mouth once daily.    TAKE 1 TABLET(25 MG) BY MOUTH EVERY DAY   Discontinued Medications    POTASSIUM CHLORIDE (MICRO-K) 10 MEQ CPSR    Take 10 mEq by mouth once.    SIMVASTATIN (ZOCOR) 20 MG TABLET    Take 1 tablet (20 mg total) by mouth every evening.

## 2018-06-21 ENCOUNTER — LAB VISIT (OUTPATIENT)
Dept: LAB | Facility: HOSPITAL | Age: 73
End: 2018-06-21
Attending: FAMILY MEDICINE
Payer: MEDICARE

## 2018-06-21 ENCOUNTER — OFFICE VISIT (OUTPATIENT)
Dept: FAMILY MEDICINE | Facility: CLINIC | Age: 73
End: 2018-06-21
Payer: MEDICARE

## 2018-06-21 VITALS
TEMPERATURE: 99 F | BODY MASS INDEX: 33.05 KG/M2 | DIASTOLIC BLOOD PRESSURE: 90 MMHG | WEIGHT: 193.56 LBS | HEART RATE: 65 BPM | SYSTOLIC BLOOD PRESSURE: 142 MMHG | HEIGHT: 64 IN | OXYGEN SATURATION: 96 %

## 2018-06-21 DIAGNOSIS — N18.30 CKD (CHRONIC KIDNEY DISEASE), STAGE III: ICD-10-CM

## 2018-06-21 DIAGNOSIS — I10 ESSENTIAL HYPERTENSION: ICD-10-CM

## 2018-06-21 DIAGNOSIS — I10 ESSENTIAL HYPERTENSION: Primary | ICD-10-CM

## 2018-06-21 DIAGNOSIS — Z91.09 ENVIRONMENTAL ALLERGIES: ICD-10-CM

## 2018-06-21 DIAGNOSIS — E78.2 MIXED HYPERLIPIDEMIA: ICD-10-CM

## 2018-06-21 LAB
ALBUMIN SERPL BCP-MCNC: 4 G/DL
ALP SERPL-CCNC: 91 U/L
ALT SERPL W/O P-5'-P-CCNC: 12 U/L
ANION GAP SERPL CALC-SCNC: 9 MMOL/L
AST SERPL-CCNC: 17 U/L
BILIRUB SERPL-MCNC: 0.4 MG/DL
BUN SERPL-MCNC: 27 MG/DL
CALCIUM SERPL-MCNC: 10.2 MG/DL
CHLORIDE SERPL-SCNC: 103 MMOL/L
CO2 SERPL-SCNC: 27 MMOL/L
CREAT SERPL-MCNC: 1.6 MG/DL
EST. GFR  (AFRICAN AMERICAN): 36.8 ML/MIN/1.73 M^2
EST. GFR  (NON AFRICAN AMERICAN): 32 ML/MIN/1.73 M^2
GLUCOSE SERPL-MCNC: 91 MG/DL
POTASSIUM SERPL-SCNC: 3.6 MMOL/L
PROT SERPL-MCNC: 7.9 G/DL
SODIUM SERPL-SCNC: 139 MMOL/L

## 2018-06-21 PROCEDURE — 99214 OFFICE O/P EST MOD 30 MIN: CPT | Mod: S$GLB,,, | Performed by: FAMILY MEDICINE

## 2018-06-21 PROCEDURE — 36415 COLL VENOUS BLD VENIPUNCTURE: CPT | Mod: PO

## 2018-06-21 PROCEDURE — 80053 COMPREHEN METABOLIC PANEL: CPT

## 2018-06-21 PROCEDURE — 3080F DIAST BP >= 90 MM HG: CPT | Mod: CPTII,S$GLB,, | Performed by: FAMILY MEDICINE

## 2018-06-21 PROCEDURE — 3077F SYST BP >= 140 MM HG: CPT | Mod: CPTII,S$GLB,, | Performed by: FAMILY MEDICINE

## 2018-06-21 PROCEDURE — 99999 PR PBB SHADOW E&M-EST. PATIENT-LVL III: CPT | Mod: PBBFAC,,, | Performed by: FAMILY MEDICINE

## 2018-06-21 RX ORDER — MAGNESIUM 250 MG
TABLET ORAL ONCE
COMMUNITY

## 2018-06-21 RX ORDER — FLUTICASONE PROPIONATE 50 MCG
1 SPRAY, SUSPENSION (ML) NASAL DAILY
Qty: 1 BOTTLE | Refills: 2 | Status: SHIPPED | OUTPATIENT
Start: 2018-06-21 | End: 2018-07-21

## 2018-06-21 RX ORDER — ASPIRIN 81 MG/1
81 TABLET ORAL DAILY
COMMUNITY

## 2018-06-21 RX ORDER — BUTALBITAL, ACETAMINOPHEN AND CAFFEINE 50; 325; 40 MG/1; MG/1; MG/1
1 TABLET ORAL EVERY 6 HOURS PRN
Qty: 30 TABLET | Refills: 1 | Status: SHIPPED | OUTPATIENT
Start: 2018-06-21 | End: 2018-10-25

## 2018-06-21 NOTE — PROGRESS NOTES
Subjective:       Patient ID: Sulema Burdick is a 73 y.o. female.    Chief Complaint: Follow-up    Sulema is a 73 y.o. female who presents today for follow up on her HTN, DLD, headaches, and hypokalemia.     HTN: It is improved from the past, but there appears to be some confusion regarding the recommendations from her renal physician. Digital HTN has been ordered for her.   DLD: cards stopped zocor, started crestor 20 mg nightly and titrate to 40 mg if needed  Hypokalemia: appears to have improved. Recheck BMP today.   CKD: recheck BMP today  Headaches: improved      Hypertension   This is a chronic problem. The current episode started more than 1 year ago. The problem has been gradually improving since onset. The problem is uncontrolled. Associated symptoms include headaches. Pertinent negatives include no anxiety, blurred vision, chest pain, malaise/fatigue, neck pain, orthopnea, palpitations, peripheral edema, PND, shortness of breath or sweats. There are no associated agents to hypertension. Risk factors for coronary artery disease include obesity and dyslipidemia. Past treatments include diuretics and calcium channel blockers. The current treatment provides moderate improvement. Compliance problems include medication side effects.      Review of Systems   Constitutional: Negative for malaise/fatigue.   Eyes: Negative for blurred vision.   Respiratory: Negative for shortness of breath.    Cardiovascular: Negative for chest pain, palpitations, orthopnea and PND.   Musculoskeletal: Negative for neck pain.   Neurological: Positive for headaches.         Results for orders placed or performed in visit on 06/01/18   Comprehensive metabolic panel   Result Value Ref Range    Sodium 142 136 - 145 mmol/L    Potassium 4.2 3.5 - 5.1 mmol/L    Chloride 105 95 - 110 mmol/L    CO2 26 23 - 29 mmol/L    Glucose 90 70 - 110 mg/dL    BUN, Bld 32 (H) 8 - 23 mg/dL    Creatinine 1.6 (H) 0.5 - 1.4 mg/dL    Calcium 10.1 8.7 - 10.5  mg/dL    Total Protein 7.7 6.0 - 8.4 g/dL    Albumin 3.9 3.5 - 5.2 g/dL    Total Bilirubin 0.5 0.1 - 1.0 mg/dL    Alkaline Phosphatase 87 55 - 135 U/L    AST 17 10 - 40 U/L    ALT 12 10 - 44 U/L    Anion Gap 11 8 - 16 mmol/L    eGFR if African American 36.8 (A) >60 mL/min/1.73 m^2    eGFR if non  32.0 (A) >60 mL/min/1.73 m^2       Objective:     Vitals:    06/21/18 1314   BP: (!) 142/90   Pulse: 65   Temp: 98.9 °F (37.2 °C)        Physical Exam   Constitutional: She appears well-developed and well-nourished.   HENT:   Head: Normocephalic and atraumatic.   TM: appear normal BL  Nasal congestion noted  Cobblestoning noted     Eyes: Conjunctivae are normal.   Neck: Normal range of motion. Neck supple.   Cardiovascular: Normal rate and regular rhythm.    Pulmonary/Chest: No respiratory distress. She has no wheezes.   Her lungs appear clear to auscultation   Abdominal: Soft. There is no tenderness.   Musculoskeletal:   There is TTP of the left piriformis muscle   Neurological: She is alert.   Skin: Skin is warm.   Psychiatric: She has a normal mood and affect. Her behavior is normal. Judgment and thought content normal.   Nursing note and vitals reviewed.      Assessment:       1. Essential hypertension    2. Mixed hyperlipidemia    3. BMI 33.0-33.9,adult    4. Environmental allergies    5. CKD (chronic kidney disease), stage III        Plan:         Essential hypertension  Reviewed renal consult note  She is currently taking both the aldactone and the HCTZ. Renal apparently wanted to switch both of these medications, but it doesn't look like patient was aware of this. No other medications appear to have been ordered. Message sent to renal physician  Continue current medications for now  BP not well controlled, but stable   DASH diet  Ambulatory BP monitoring PRN  Follow up in 4 months and with renal physician earlier  -     Comprehensive metabolic panel; Future; Expected date: 06/21/2018    Mixed  hyperlipidemia  Continue Crestor    BMI 33.0-33.9,adult  DASH diet  Weight loss  Increase exercise    Environmental allergies  Start zyrtec  flonase  If not covered by insurance, can buy in bulk at [employer]  -     fluticasone (FLONASE) 50 mcg/actuation nasal spray; 1 spray (50 mcg total) by Each Nare route once daily.  Dispense: 1 Bottle; Refill: 2    CKD (chronic kidney disease), stage III  No more NSAIDS  Check CMP today  Renal follow up  -     Comprehensive metabolic panel; Future; Expected date: 06/21/2018    Other orders  -     butalbital-acetaminophen-caffeine -40 mg (FIORICET, ESGIC) -40 mg per tablet; Take 1 tablet by mouth every 6 (six) hours as needed for Pain.  Dispense: 30 tablet; Refill: 1    Warning signs discussed, patient to call with any further issues or worsening of symptoms.

## 2018-06-22 ENCOUNTER — TELEPHONE (OUTPATIENT)
Dept: FAMILY MEDICINE | Facility: CLINIC | Age: 73
End: 2018-06-22

## 2018-06-22 NOTE — TELEPHONE ENCOUNTER
----- Message from Usman Brunner DO sent at 6/22/2018  8:07 AM CDT -----  Please call the patient regarding her normal result. The potassium is normal. Kidney function is stable.

## 2018-07-05 DIAGNOSIS — I10 ESSENTIAL HYPERTENSION: ICD-10-CM

## 2018-07-05 RX ORDER — AMLODIPINE BESYLATE 10 MG/1
TABLET ORAL
Qty: 30 TABLET | Refills: 0 | Status: SHIPPED | OUTPATIENT
Start: 2018-07-05 | End: 2018-10-25 | Stop reason: SDUPTHER

## 2018-07-09 ENCOUNTER — TELEPHONE (OUTPATIENT)
Dept: FAMILY MEDICINE | Facility: CLINIC | Age: 73
End: 2018-07-09

## 2018-07-09 NOTE — TELEPHONE ENCOUNTER
----- Message from Usman Brunner DO sent at 7/5/2018 10:20 AM CDT -----  Please call patient and let her know that I have not heard back from her kidney doctor. I would recommend that she call asking for BP medication recommendations or send a message to him via [employer] to get a clarification on her medication regimen. Thanks.    Usman

## 2018-07-09 NOTE — TELEPHONE ENCOUNTER
I spoke with patient and advised her to give her kidney doctor a call so that she can get clarification on medication regimen.Patient voiced understanding.

## 2018-07-15 DIAGNOSIS — Z78.9 STATIN INTOLERANCE: ICD-10-CM

## 2018-07-15 DIAGNOSIS — E78.5 HYPERLIPIDEMIA, UNSPECIFIED HYPERLIPIDEMIA TYPE: ICD-10-CM

## 2018-07-15 RX ORDER — SIMVASTATIN 20 MG/1
TABLET, FILM COATED ORAL
Qty: 90 TABLET | Refills: 0 | OUTPATIENT
Start: 2018-07-15

## 2018-07-25 ENCOUNTER — TELEPHONE (OUTPATIENT)
Dept: FAMILY MEDICINE | Facility: CLINIC | Age: 73
End: 2018-07-25

## 2018-07-25 NOTE — TELEPHONE ENCOUNTER
----- Message from Saige Sloan sent at 7/25/2018  9:34 AM CDT -----  Contact: [phone]/self  Patient requesting to speak with you regarding her kidney drYsabel Please call and advise.

## 2018-09-11 RX ORDER — EPLERENONE 25 MG/1
25 TABLET, FILM COATED ORAL DAILY
Qty: 30 TABLET | Refills: 11 | Status: SHIPPED | OUTPATIENT
Start: 2018-09-11 | End: 2019-09-11

## 2018-10-22 DIAGNOSIS — E78.5 HYPERLIPIDEMIA, UNSPECIFIED HYPERLIPIDEMIA TYPE: ICD-10-CM

## 2018-10-22 RX ORDER — ATORVASTATIN CALCIUM 10 MG/1
TABLET, FILM COATED ORAL
Qty: 90 TABLET | Refills: 0 | OUTPATIENT
Start: 2018-10-22

## 2018-10-25 ENCOUNTER — OFFICE VISIT (OUTPATIENT)
Dept: FAMILY MEDICINE | Facility: CLINIC | Age: 73
End: 2018-10-25
Payer: MEDICARE

## 2018-10-25 ENCOUNTER — LAB VISIT (OUTPATIENT)
Dept: LAB | Facility: HOSPITAL | Age: 73
End: 2018-10-25
Attending: FAMILY MEDICINE
Payer: MEDICARE

## 2018-10-25 VITALS
BODY MASS INDEX: 34.53 KG/M2 | HEIGHT: 63 IN | DIASTOLIC BLOOD PRESSURE: 96 MMHG | OXYGEN SATURATION: 97 % | WEIGHT: 194.88 LBS | TEMPERATURE: 99 F | HEART RATE: 72 BPM | SYSTOLIC BLOOD PRESSURE: 160 MMHG

## 2018-10-25 DIAGNOSIS — I10 ESSENTIAL HYPERTENSION: Primary | ICD-10-CM

## 2018-10-25 DIAGNOSIS — G44.86 CERVICOGENIC HEADACHE: ICD-10-CM

## 2018-10-25 DIAGNOSIS — Z23 NEED FOR INFLUENZA VACCINATION: ICD-10-CM

## 2018-10-25 DIAGNOSIS — N18.30 CKD (CHRONIC KIDNEY DISEASE), STAGE III: ICD-10-CM

## 2018-10-25 DIAGNOSIS — E78.2 MIXED HYPERLIPIDEMIA: ICD-10-CM

## 2018-10-25 DIAGNOSIS — Z91.09 ENVIRONMENTAL ALLERGIES: ICD-10-CM

## 2018-10-25 DIAGNOSIS — I10 ESSENTIAL HYPERTENSION: ICD-10-CM

## 2018-10-25 DIAGNOSIS — Z91.148 NON COMPLIANCE W MEDICATION REGIMEN: ICD-10-CM

## 2018-10-25 DIAGNOSIS — L29.9 ITCHING: ICD-10-CM

## 2018-10-25 LAB
25(OH)D3+25(OH)D2 SERPL-MCNC: 18 NG/ML
ALBUMIN SERPL BCP-MCNC: 3.9 G/DL
ALP SERPL-CCNC: 99 U/L
ALT SERPL W/O P-5'-P-CCNC: 12 U/L
ANION GAP SERPL CALC-SCNC: 8 MMOL/L
AST SERPL-CCNC: 18 U/L
BASOPHILS # BLD AUTO: 0.05 K/UL
BASOPHILS NFR BLD: 0.6 %
BILIRUB SERPL-MCNC: 0.3 MG/DL
BILIRUB UR QL STRIP: NEGATIVE
BUN SERPL-MCNC: 19 MG/DL
CALCIUM SERPL-MCNC: 10.2 MG/DL
CHLORIDE SERPL-SCNC: 104 MMOL/L
CLARITY UR REFRACT.AUTO: ABNORMAL
CO2 SERPL-SCNC: 30 MMOL/L
COLOR UR AUTO: YELLOW
CREAT SERPL-MCNC: 1.5 MG/DL
DIFFERENTIAL METHOD: ABNORMAL
EOSINOPHIL # BLD AUTO: 0.2 K/UL
EOSINOPHIL NFR BLD: 2.5 %
ERYTHROCYTE [DISTWIDTH] IN BLOOD BY AUTOMATED COUNT: 15.9 %
EST. GFR  (AFRICAN AMERICAN): 39.8 ML/MIN/1.73 M^2
EST. GFR  (NON AFRICAN AMERICAN): 34.6 ML/MIN/1.73 M^2
GLUCOSE SERPL-MCNC: 80 MG/DL
GLUCOSE UR QL STRIP: NEGATIVE
HCT VFR BLD AUTO: 40.1 %
HGB BLD-MCNC: 12.4 G/DL
HGB UR QL STRIP: NEGATIVE
IMM GRANULOCYTES # BLD AUTO: 0.01 K/UL
IMM GRANULOCYTES NFR BLD AUTO: 0.1 %
KETONES UR QL STRIP: NEGATIVE
LEUKOCYTE ESTERASE UR QL STRIP: NEGATIVE
LYMPHOCYTES # BLD AUTO: 3.6 K/UL
LYMPHOCYTES NFR BLD: 41.5 %
MCH RBC QN AUTO: 24.8 PG
MCHC RBC AUTO-ENTMCNC: 30.9 G/DL
MCV RBC AUTO: 80 FL
MONOCYTES # BLD AUTO: 0.6 K/UL
MONOCYTES NFR BLD: 6.7 %
NEUTROPHILS # BLD AUTO: 4.2 K/UL
NEUTROPHILS NFR BLD: 48.6 %
NITRITE UR QL STRIP: NEGATIVE
NRBC BLD-RTO: 0 /100 WBC
PH UR STRIP: 6 [PH] (ref 5–8)
PLATELET # BLD AUTO: 377 K/UL
PMV BLD AUTO: 11 FL
POTASSIUM SERPL-SCNC: 3.3 MMOL/L
PROT SERPL-MCNC: 8 G/DL
PROT UR QL STRIP: NEGATIVE
PTH-INTACT SERPL-MCNC: 183 PG/ML
RBC # BLD AUTO: 5 M/UL
SODIUM SERPL-SCNC: 142 MMOL/L
SP GR UR STRIP: 1.01 (ref 1–1.03)
URN SPEC COLLECT METH UR: ABNORMAL
WBC # BLD AUTO: 8.57 K/UL

## 2018-10-25 PROCEDURE — 36415 COLL VENOUS BLD VENIPUNCTURE: CPT | Mod: PO

## 2018-10-25 PROCEDURE — 3077F SYST BP >= 140 MM HG: CPT | Mod: CPTII,,, | Performed by: FAMILY MEDICINE

## 2018-10-25 PROCEDURE — 3080F DIAST BP >= 90 MM HG: CPT | Mod: CPTII,,, | Performed by: FAMILY MEDICINE

## 2018-10-25 PROCEDURE — 99999 PR PBB SHADOW E&M-EST. PATIENT-LVL IV: CPT | Mod: PBBFAC,,, | Performed by: FAMILY MEDICINE

## 2018-10-25 PROCEDURE — 99214 OFFICE O/P EST MOD 30 MIN: CPT | Mod: S$PBB,,, | Performed by: FAMILY MEDICINE

## 2018-10-25 PROCEDURE — 90662 IIV NO PRSV INCREASED AG IM: CPT | Mod: PBBFAC,PO

## 2018-10-25 PROCEDURE — 1101F PT FALLS ASSESS-DOCD LE1/YR: CPT | Mod: CPTII,,, | Performed by: FAMILY MEDICINE

## 2018-10-25 PROCEDURE — 99214 OFFICE O/P EST MOD 30 MIN: CPT | Mod: PBBFAC,PO,25 | Performed by: FAMILY MEDICINE

## 2018-10-25 PROCEDURE — 82306 VITAMIN D 25 HYDROXY: CPT

## 2018-10-25 PROCEDURE — 83970 ASSAY OF PARATHORMONE: CPT

## 2018-10-25 PROCEDURE — 80053 COMPREHEN METABOLIC PANEL: CPT

## 2018-10-25 PROCEDURE — 81003 URINALYSIS AUTO W/O SCOPE: CPT

## 2018-10-25 PROCEDURE — 85025 COMPLETE CBC W/AUTO DIFF WBC: CPT

## 2018-10-25 RX ORDER — TIZANIDINE 2 MG/1
2 TABLET ORAL NIGHTLY PRN
Qty: 90 TABLET | Refills: 0 | Status: SHIPPED | OUTPATIENT
Start: 2018-10-25 | End: 2018-11-04

## 2018-10-25 RX ORDER — TIZANIDINE 2 MG/1
2 TABLET ORAL NIGHTLY
Qty: 90 TABLET | Refills: 0 | Status: SHIPPED | OUTPATIENT
Start: 2018-10-25 | End: 2018-10-25 | Stop reason: SDUPTHER

## 2018-10-25 RX ORDER — FLUTICASONE PROPIONATE 50 MCG
2 SPRAY, SUSPENSION (ML) NASAL DAILY
Qty: 16 G | Refills: 12 | Status: SHIPPED | OUTPATIENT
Start: 2018-10-25 | End: 2018-11-24

## 2018-10-25 RX ORDER — AMLODIPINE BESYLATE 10 MG/1
TABLET ORAL
Qty: 90 TABLET | Refills: 1 | Status: SHIPPED | OUTPATIENT
Start: 2018-10-25 | End: 2019-04-17 | Stop reason: SDUPTHER

## 2018-10-25 RX ORDER — CETIRIZINE HYDROCHLORIDE 10 MG/1
10 TABLET ORAL DAILY
Qty: 90 TABLET | Refills: 6 | Status: SHIPPED | OUTPATIENT
Start: 2018-10-25 | End: 2019-10-25

## 2018-10-25 NOTE — PATIENT INSTRUCTIONS
No NSAIDS such as ibuprofen or naproxen. Avoid Red meats. Aggressive fluids. Continue monitoring CMP q3-6 months. Need close follow up with a renal physician.     Muscle relaxants per medication orders. THIS MAY BE SEDATING. PLEASE BE CAREFUL WITH DRIVING UNTIL YOU KNOW HOW THIS AFFECTS YOU    There are limited therapeutic options given intolerance to aldactone, Eplereone, losartan, and lisinopril.       Low-Salt Diet  This diet removes foods that are high in salt. It also limits the amount of salt you use when cooking. It is most often used for people with high blood pressure, edema (fluid retention), and kidney, liver, or heart disease.  Table salt contains the mineral sodium. Your body needs sodium to work normally. But too much sodium can make your health problems worse. Your healthcare provider is recommending a low-salt (also called low-sodium) diet for you. Your total daily allowance of salt is 1,500 to 2,300 milligrams (mg). It is less than 1 teaspoon of table salt. This means you can have only about 500 to 700 mg of sodium at each meal. People with certain health problems should limit salt intake to the lower end of the recommended range.    When you cook, dont add much salt. If you can cook without using salt, even better. Dont add salt to your food at the table.  When shopping, read food labels. Salt is often called sodium on the label. Choose foods that are salt-free, low salt, or very low salt. Note that foods with reduced salt may not lower your salt intake enough.    Beans, potatoes, and pasta  Ok: Dry beans, split peas, lentils, potatoes, rice, macaroni, pasta, spaghetti without added salt  Avoid: Potato chips, tortilla chips, and similar products  Breads and cereals  Ok: Low-sodium breads, rolls, cereals, and cakes; low-salt crackers, matzo crackers  Avoid: Salted crackers, pretzels, popcorn, Hungarian toast, pancakes, muffins  Dairy  Ok: Milk, chocolate milk, hot chocolate mix, low-salt cheeses, and  yogurt  Avoid: Processed cheese and cheese spreads; Roquefort, Camembert, and cottage cheese; buttermilk, instant breakfast drink  Desserts  Ok: Ice cream, frozen yogurt, juice bars, gelatin, cookies and pies, sugar, honey, jelly, hard candy  Avoid: Most pies, cakes and cookies prepared or processed with salt; instant pudding  Drinks  Ok: Tea, coffee, fizzy (carbonated) drinks, juices  Avoid: Flavored coffees, electrolyte replacement drinks, sports drinks  Meats  Ok: All fresh meat, fish, poultry, low-salt tuna, eggs, egg substitute  Avoid: Smoked, pickled, brine-cured, or salted meats and fish. This includes rodrigez, chipped beef, corned beef, hot dogs, deli meats, ham, kosher meats, salt pork, sausage, canned tuna, salted codfish, smoked salmon, herring, sardines, or anchovies.  Seasonings and spices  Ok: Most seasonings are okay. Good substitutes for salt include: fresh herb blends, hot sauce, lemon, garlic, ruiz, vinegar, dry mustard, parsley, cilantro, horseradish, tomato paste, regular margarine, mayonnaise, unsalted butter, cream cheese, vegetable oil, cream, low-salt salad dressing and gravy.  Avoid: Regular ketchup, relishes, pickles, soy sauce, teriyaki sauce, Worcestershire sauce, BBQ sauce, tartar sauce, meat tenderizer, chili sauce, regular gravy, regular salad dressing, salted butter  Soups  Ok: Low-salt soups and broths made with allowed foods  Avoid: Bouillon cubes, soups with smoked or salted meats, regular soup and broth  Vegetables  Ok: Most vegetables are okay; also low-salt tomato and vegetable juices  Avoid: Sauerkraut and other brine-soaked vegetables; pickles and other pickled vegetables; tomato juice, olives  Date Last Reviewed: 8/1/2016  © 0743-2241 [employer]. [address] [place] [address], [place] [address]. All rights reserved. This information is not intended as a substitute for professional medical care. Always follow your healthcare professional's instructions.        4 Steps  for Eating Healthier  Changing the way you eat can improve your health. It can lower your cholesterol and blood pressure, and help you stay at a healthy weight. Your diet doesnt have to be bland and boring to be healthy. Just watch your calories and follow these steps:    1. Eat fewer unhealthy fats  · Choose more fish and lean meats instead of fatty cuts of meat.  · Skip butter and lard, and use less margarine.  · Pass on foods that have palm, coconut, or hydrogenated oils.  · Eat fewer high-fat dairy foods like cheese, ice cream, and whole milk.  · Get a heart-healthy cookbook and try some low-fat recipes.  2. Go light on salt  · Keep the saltshaker off the table.  · Limit high-salt ingredients, such as soy sauce, bouillon, and garlic salt.  · Instead of adding salt when cooking, season your food with herbs and flavorings. Try lemon, garlic, and onion.  · Limit convenience foods, such as boxed or canned foods and restaurant food.  · Read food labels and choose lower-sodium options.  3. Limit sugar  · Pause before you add sugars to pancakes, cereal, coffee, or tea. This includes white and brown table sugar, syrup, honey, and molasses. Cut your usual amount by half.  · Use non-sugar sweeteners. Stevia, aspartame, and sucralose can satisfy a sweet tooth without adding calories.  · Swap out sugar-filled soda and other drinks. Buy sugar-free or low-calorie beverages. Remember water is always the best choice.  · Read labels and choose foods with less added sugar. Keep in mind that dairy foods and foods with fruit will have some natural sugar.  · Cut the sugar in recipes by 1/3 to 1/2. Boost the flavor with extracts like almond, vanilla, or orange. Or add spices such as cinnamon or nutmeg.  4. Eat more fiber  · Eat fresh fruits and vegetables every day.  · Boost your diet with whole grains. Go for oats, whole-grain rice, and bran.  · Add beans and lentils to your meals.  · Drink more water to match your fiber increase.  This is to help prevent constipation.  Date Last Reviewed: 5/11/2015  © 9626-3322 [employer]. [address] [place] [address], [place] [address]. All rights reserved. This information is not intended as a substitute for professional medical care. Always follow your healthcare professional's instructions.        Understanding Fat and Cholesterol  Too much cholesterol in your blood can lead to many problems such as blocked arteries. This can lead to heart attack and stroke. One of the best ways to manage heart and blood vessel disease is to lower your blood cholesterol. Planning meals that are low in saturated fat and cholesterol helps reduce the level of cholesterol in your blood. Below are eating tips to help lower your blood cholesterol levels.  Eat less fat  A healthy goal is to have less than 25% to 35% of your daily calories come from fat. Instead of fats, eat more fruits, whole-grains, and vegetables. This also helps control your weight, and can even reduce your risk for some cancers. There are different kinds of fats in foods. Fats can be saturated, unsaturated, or trans fats. The best fats to choose are unsaturated fats. But fats are high in calories, so eat even unsaturated fats sparingly.  Limit foods high in saturated fats  Saturated fats come from animals and certain plants (such as coconut and palm). Eating too much saturated fat can raise your blood cholesterol levels and make your artery problems worse. Your goal is to eat less saturated fat. Below are some examples of foods that contain lots of saturated fat:  · Fatty cuts of meat (lamb, ham, beef)  · Many pastries, cakes, cookies, and candies  · Cream, ice cream, sour cream, cheese, and butter, and foods made with them  · Sauces made with butter or cream  · Salad dressings with saturated fats  · Foods that contain palm or coconut oil  Choose unsaturated fats  Unsaturated fats are usually liquid at room temperature. They are better choices for  your heart than saturated fat. There are two types of unsaturated fats: polyunsaturated fat and monounsaturated fat. Aim to replace saturated fats with polyunsaturated or monounsaturated fats.  · Polyunsaturated fats are found in corn oil, safflower oil, sunflower oil, and other vegetable oils.  · Monounsaturated fats are found in olive oil, canola oil, and peanut oil. Some margarines and spreads are now made with these oils, too. Avocados are also high in monounsaturated fat.  Of all fats, monounsaturated fats are the least harmful to your heart.  Avoid trans fats  Like saturated fats, trans fats have been linked to heart disease. Even a small amount can harm your health. Trans fats are found in liquid oils that have been changed to be solid at room temperature. Margarine, which is often made from vegetable oil, is one example. Vegetable shortening is another. Trans fats are often found in packaged goods. Check ingredients for the words hydrogenated or partially hydrogenated. They mean the foods contain trans fat.  What about triglycerides?  Triglycerides are a type of fat in your blood. Like cholesterol, high levels of triglycerides can lead to blocked arteries. High triglyceride levels can be reduced 20% to 50%  by limiting added sugars in your diet, susbstituting healthier fats for saturated and trans fats, getting more physical activity, and losing weight if your are overweight. You may also be advised to avoid or limi alcohol.    Reading food labels  Luckily, most foods now have labels giving you the facts about what youre eating. Reading food labels helps you make healthy choices. Look for the words highlighted below.  · Serving Size. This is the amount of food in 1 serving. If you eat larger portions, be sure to count more of everything: fat, calories, and cholesterol.  · Total Fat. Tells you how many grams (g) of fat are in 1 serving.  · Calories from Fat. This tells you the total number of calories  from fat in 1 serving (there are 9 calories per gram of fat). Look for foods with the fewest calories from fat.  · Saturated Fat. Tells you how many grams (g) of saturated fat are in 1 serving.  · Trans Fat. Tells how many grams (g) of trans fat are in 1 serving.  · Cholesterol. Tells you how many milligrams (mg) of cholesterol are in 1 serving.  Date Last Reviewed: 5/11/2015  © 5337-2017 [email or web address]. [address] [place] [address], [place] [address]. All rights reserved. This information is not intended as a substitute for professional medical care. Always follow your healthcare professional's instructions.

## 2018-10-25 NOTE — PROGRESS NOTES
Subjective:       Patient ID: Sulema Burdick is a 73 y.o. female.    Chief Complaint: Hypertension    Sulema is a 73 y.o. female who presents today for follow up on her chronic medical issues.     She is supposed to be following with renal, but I do not think she has seen them recently.  She was switched from spironolactone to hip where now on 25 mg.  She is noncompliant with this medication regimen.  She is taking it about once a week.  She thinks that it causes her to itch.    HTN: this has been uncontrolled. She states that spironolactone makes her cough and spit. She states that losartan makes her cough.   DLD: cards stopped zocor, started crestor 20 mg nightly and titrate to 40 mg if needed  Hypokalemia: check CMP  CKD: check CMP today  Headaches: improved      Hypertension   This is a chronic problem. The problem is uncontrolled. Associated symptoms include headaches. Pertinent negatives include no chest pain, neck pain, palpitations or shortness of breath. Risk factors for coronary artery disease include obesity and dyslipidemia. Past treatments include diuretics and calcium channel blockers. There are no compliance problems.      Review of Systems   Constitutional: Negative for chills and fever.   Respiratory: Positive for cough. Negative for shortness of breath.    Cardiovascular: Negative for chest pain and palpitations.   Musculoskeletal: Negative for neck pain.   Skin:        itching   Neurological: Positive for headaches.       Objective:     Vitals:    10/25/18 1256   BP: (!) 160/96   Pulse: 72   Temp: 99 °F (37.2 °C)        Physical Exam   Constitutional: She is oriented to person, place, and time. She appears well-developed and well-nourished. No distress.   HENT:   Head: Normocephalic and atraumatic.   Neck: Normal range of motion. Neck supple.   Cardiovascular: Normal rate and regular rhythm.   Pulmonary/Chest: Effort normal and breath sounds normal.   Abdominal: Soft. There is no tenderness.   obese    Musculoskeletal: She exhibits no edema.   Neurological: She is alert and oriented to person, place, and time.   Skin: She is not diaphoretic.   Psychiatric: She has a normal mood and affect. Her behavior is normal. Judgment and thought content normal.   Nursing note and vitals reviewed.      Assessment:       1. Essential hypertension    2. Mixed hyperlipidemia    3. CKD (chronic kidney disease), stage III    4. BMI 34.0-34.9,adult    5. Cervicogenic headache    6. Need for influenza vaccination    7. Non compliance w medication regimen    8. Environmental allergies    9. Itching        Plan:       There are limited therapeutic options given intolerance to aldactone, Eplereone, losartan, and lisinopril.  She continues to have high blood pressure and I have repeatedly told her the risks of elevated blood pressure including kidney failure, heart attack, or stroke.  I have also stressed the importance of renal follow-up.  She has not seen a nephrologist since her last visit to see me. No NSAIDS such as ibuprofen or naproxen. Avoid Red meats. Aggressive fluids. Continue monitoring CMP q3-6 months.   I would like her to be weaned off of her diuretic medications given her CKD, however I will defer this to renal at this time.    She was started on Zanaflex 2 mg nightly.  This can be used for her cervicogenic headaches and sciatica. THIS MAY BE SEDATING. PLEASE BE CAREFUL WITH DRIVING UNTIL YOU KNOW HOW THIS AFFECTS YOU    She is to continue Crestor 20 mg  She is to continue for now, hydrochlorothiazide 25 mg and amlodipine 10 mg and eplerenone own as tolerated  She is to continue Zyrtec q.h.s. as needed for itching.     Essential hypertension  -     Ambulatory Referral to Nephrology  -     amLODIPine (NORVASC) 10 MG tablet; TAKE 1 TABLET(10 MG) BY MOUTH EVERY DAY  Dispense: 90 tablet; Refill: 1  -     CBC auto differential; Future; Expected date: 10/25/2018    Mixed hyperlipidemia    CKD (chronic kidney disease), stage  III  -     Comprehensive metabolic panel; Future; Expected date: 10/25/2018  -     Ambulatory Referral to Nephrology  -     CBC auto differential; Future; Expected date: 10/25/2018  -     Vitamin D; Future; Expected date: 10/25/2018  -     Urinalysis  -     PTH, intact; Future; Expected date: 10/25/2018    BMI 34.0-34.9,adult    Cervicogenic headache  -     Discontinue: tiZANidine (ZANAFLEX) 2 MG tablet; Take 1 tablet (2 mg total) by mouth every evening. for 10 days  Dispense: 90 tablet; Refill: 0  -     tiZANidine (ZANAFLEX) 2 MG tablet; Take 1 tablet (2 mg total) by mouth nightly as needed.  Dispense: 90 tablet; Refill: 0    Need for influenza vaccination  -     Influenza - High Dose (65+) (PF) (IM)    Non compliance w medication regimen    Environmental allergies  -     fluticasone (FLONASE) 50 mcg/actuation nasal spray; 2 sprays (100 mcg total) by Each Nare route once daily.  Dispense: 16 g; Refill: 12    Itching  -     cetirizine (ZYRTEC) 10 MG tablet; Take 1 tablet (10 mg total) by mouth once daily.  Dispense: 90 tablet; Refill: 6      Warning signs discussed, patient to call with any further issues or worsening of symptoms.

## 2018-10-29 ENCOUNTER — LAB VISIT (OUTPATIENT)
Dept: LAB | Facility: HOSPITAL | Age: 73
End: 2018-10-29
Attending: INTERNAL MEDICINE
Payer: MEDICARE

## 2018-10-29 DIAGNOSIS — E55.9 VITAMIN D DEFICIENCY: ICD-10-CM

## 2018-10-29 DIAGNOSIS — I10 ESSENTIAL HYPERTENSION: ICD-10-CM

## 2018-10-29 LAB
25(OH)D3+25(OH)D2 SERPL-MCNC: 18 NG/ML
PTH-INTACT SERPL-MCNC: 216.1 PG/ML
URATE SERPL-MCNC: 9 MG/DL

## 2018-10-29 PROCEDURE — 36415 COLL VENOUS BLD VENIPUNCTURE: CPT

## 2018-10-29 PROCEDURE — 82088 ASSAY OF ALDOSTERONE: CPT

## 2018-10-29 PROCEDURE — 84550 ASSAY OF BLOOD/URIC ACID: CPT

## 2018-10-29 PROCEDURE — 83970 ASSAY OF PARATHORMONE: CPT

## 2018-10-29 PROCEDURE — 82306 VITAMIN D 25 HYDROXY: CPT

## 2018-10-29 PROCEDURE — 84244 ASSAY OF RENIN: CPT

## 2018-11-01 LAB
ALDOST SERPL-MCNC: 13.8 NG/DL
RENIN PLAS-CCNC: 1.6 NG/ML/H

## 2018-12-04 ENCOUNTER — TELEPHONE (OUTPATIENT)
Dept: FAMILY MEDICINE | Facility: CLINIC | Age: 73
End: 2018-12-04

## 2018-12-04 NOTE — TELEPHONE ENCOUNTER
Patient already called pharmacist and states this two medication at this specific pharmacy is NOT in recall.  Patient will continue taking this medications.

## 2018-12-04 NOTE — TELEPHONE ENCOUNTER
----- Message from Usman Brunner DO sent at 12/4/2018 11:54 AM CST -----  Contact: self/[phone]  She needs to call her pharmacy and see if her particular pills are on recall.   ----- Message -----  From: Rashida Mondragon MA  Sent: 12/4/2018  10:18 AM  To: Usman Brunner DO        ----- Message -----  From: Joshua Peoples  Sent: 12/4/2018   9:49 AM  To: Myesha Mary Staff    Patient called to speak with the doctor about the current recalls on the medications listed below.  She stated she is not taking either one until she hears back from the doctor.    Please call and advise.    hydroCHLOROthiazide (HYDRODIURIL) 25 MG tablet  amLODIPine (NORVASC) 10 MG tablet

## 2019-01-20 DIAGNOSIS — G44.86 CERVICOGENIC HEADACHE: ICD-10-CM

## 2019-01-20 RX ORDER — TIZANIDINE 2 MG/1
TABLET ORAL
Qty: 90 TABLET | Refills: 0 | Status: SHIPPED | OUTPATIENT
Start: 2019-01-20 | End: 2019-04-17 | Stop reason: SDUPTHER

## 2019-04-17 DIAGNOSIS — I10 ESSENTIAL HYPERTENSION: ICD-10-CM

## 2019-04-17 DIAGNOSIS — G44.86 CERVICOGENIC HEADACHE: ICD-10-CM

## 2019-04-18 DIAGNOSIS — G44.86 CERVICOGENIC HEADACHE: ICD-10-CM

## 2019-04-18 DIAGNOSIS — I10 ESSENTIAL HYPERTENSION: ICD-10-CM

## 2019-04-18 RX ORDER — TIZANIDINE 2 MG/1
TABLET ORAL
Qty: 30 TABLET | Refills: 0 | Status: SHIPPED | OUTPATIENT
Start: 2019-04-18 | End: 2019-04-18 | Stop reason: SDUPTHER

## 2019-04-18 RX ORDER — TIZANIDINE 2 MG/1
TABLET ORAL
Qty: 10 TABLET | Refills: 0 | Status: SHIPPED | OUTPATIENT
Start: 2019-04-18

## 2019-04-18 RX ORDER — AMLODIPINE BESYLATE 10 MG/1
TABLET ORAL
Qty: 30 TABLET | Refills: 0 | Status: SHIPPED | OUTPATIENT
Start: 2019-04-18 | End: 2019-04-18 | Stop reason: SDUPTHER

## 2019-04-18 RX ORDER — AMLODIPINE BESYLATE 10 MG/1
TABLET ORAL
Qty: 90 TABLET | Refills: 0 | Status: SHIPPED | OUTPATIENT
Start: 2019-04-18 | End: 2019-07-16 | Stop reason: SDUPTHER

## 2019-05-17 DIAGNOSIS — K21.9 GASTROESOPHAGEAL REFLUX DISEASE, ESOPHAGITIS PRESENCE NOT SPECIFIED: ICD-10-CM

## 2019-06-23 DIAGNOSIS — I10 ESSENTIAL HYPERTENSION: ICD-10-CM

## 2019-06-23 RX ORDER — HYDROCHLOROTHIAZIDE 25 MG/1
TABLET ORAL
Qty: 90 TABLET | Refills: 3 | Status: SHIPPED | OUTPATIENT
Start: 2019-06-23 | End: 2020-06-23

## 2019-07-16 DIAGNOSIS — I10 ESSENTIAL HYPERTENSION: ICD-10-CM

## 2019-07-17 DIAGNOSIS — I10 ESSENTIAL HYPERTENSION: ICD-10-CM

## 2019-07-17 RX ORDER — AMLODIPINE BESYLATE 10 MG/1
TABLET ORAL
Qty: 30 TABLET | Refills: 0 | Status: SHIPPED | OUTPATIENT
Start: 2019-07-17

## 2019-07-17 RX ORDER — AMLODIPINE BESYLATE 10 MG/1
TABLET ORAL
Qty: 90 TABLET | Refills: 0 | OUTPATIENT
Start: 2019-07-17

## 2019-11-04 DIAGNOSIS — Z91.09 ENVIRONMENTAL ALLERGIES: ICD-10-CM

## 2019-11-04 RX ORDER — FLUTICASONE PROPIONATE 50 MCG
SPRAY, SUSPENSION (ML) NASAL
Qty: 16 ML | Refills: 0 | OUTPATIENT
Start: 2019-11-04

## 2019-11-05 DIAGNOSIS — Z91.09 ENVIRONMENTAL ALLERGIES: ICD-10-CM

## 2019-11-06 RX ORDER — FLUTICASONE PROPIONATE 50 MCG
SPRAY, SUSPENSION (ML) NASAL
Qty: 16 ML | Refills: 0 | OUTPATIENT
Start: 2019-11-06

## 2020-01-14 DIAGNOSIS — Z91.09 ENVIRONMENTAL ALLERGIES: ICD-10-CM

## 2020-01-14 RX ORDER — FLUTICASONE PROPIONATE 50 MCG
SPRAY, SUSPENSION (ML) NASAL
Qty: 16 G | Refills: 12 | OUTPATIENT
Start: 2020-01-14

## 2021-01-10 ENCOUNTER — IMMUNIZATION (OUTPATIENT)
Dept: PRIMARY CARE CLINIC | Facility: CLINIC | Age: 76
End: 2021-01-10
Payer: MEDICARE

## 2021-01-10 DIAGNOSIS — Z23 NEED FOR VACCINATION: ICD-10-CM

## 2021-01-10 PROCEDURE — 91300 COVID-19, MRNA, LNP-S, PF, 30 MCG/0.3 ML DOSE VACCINE: CPT | Mod: PBBFAC | Performed by: FAMILY MEDICINE

## 2021-01-31 ENCOUNTER — IMMUNIZATION (OUTPATIENT)
Dept: PRIMARY CARE CLINIC | Facility: CLINIC | Age: 76
End: 2021-01-31
Payer: MEDICARE

## 2021-01-31 DIAGNOSIS — Z23 NEED FOR VACCINATION: Primary | ICD-10-CM

## 2021-01-31 PROCEDURE — 0002A COVID-19, MRNA, LNP-S, PF, 30 MCG/0.3 ML DOSE VACCINE: CPT | Mod: CV19,S$GLB,, | Performed by: EMERGENCY MEDICINE

## 2021-01-31 PROCEDURE — 91300 COVID-19, MRNA, LNP-S, PF, 30 MCG/0.3 ML DOSE VACCINE: CPT | Mod: S$GLB,,, | Performed by: EMERGENCY MEDICINE

## 2021-01-31 PROCEDURE — 0002A COVID-19, MRNA, LNP-S, PF, 30 MCG/0.3 ML DOSE VACCINE: ICD-10-PCS | Mod: CV19,S$GLB,, | Performed by: EMERGENCY MEDICINE

## 2021-01-31 PROCEDURE — 91300 COVID-19, MRNA, LNP-S, PF, 30 MCG/0.3 ML DOSE VACCINE: ICD-10-PCS | Mod: S$GLB,,, | Performed by: EMERGENCY MEDICINE

## 2023-05-29 ENCOUNTER — TELEPHONE (OUTPATIENT)
Dept: FAMILY MEDICINE | Facility: CLINIC | Age: 78
End: 2023-05-29
Payer: MEDICARE

## 2023-08-23 ENCOUNTER — PATIENT OUTREACH (OUTPATIENT)
Dept: ADMINISTRATIVE | Facility: HOSPITAL | Age: 78
End: 2023-08-23
Payer: MEDICARE

## 2023-08-30 ENCOUNTER — PATIENT OUTREACH (OUTPATIENT)
Dept: ADMINISTRATIVE | Facility: HOSPITAL | Age: 78
End: 2023-08-30
Payer: MEDICARE

## 2023-08-30 NOTE — PROGRESS NOTES
08/30/2023  Sched PCP APPt   Gap report audit performed. Care Everywhere updates requested and reviewed  Overdue HM topic chart audit and/or requested. LINKS triggered and reconciled. Media reviewed :

## 2024-01-29 ENCOUNTER — PATIENT OUTREACH (OUTPATIENT)
Dept: ADMINISTRATIVE | Facility: HOSPITAL | Age: 79
End: 2024-01-29

## 2024-01-29 NOTE — PROGRESS NOTES
01/29/2024 Gap report audit performed. Care Everywhere updates requested and reviewed  Overdue HM topic chart audit and/or requested. LINKS triggered and reconciled. Media reviewed : HTN  Patient outreach regarding Health Maintenance- left VM /portal message sent.

## 2024-10-21 NOTE — PROGRESS NOTES
[facility] Note    Chief Complaint      Chief Complaint   Patient presents with    [facility]    reguritation    Fatigue     knees    Knee Pain    Hypertension    Foot Swelling       History of Present Illness      Sulema Burdick is a 79 y.o. female who presents today for   Chief Complaint   Patient presents with    [facility]    reguritation    Fatigue     knees    Knee Pain    Hypertension    Foot Swelling         Patient is new to me.  She presents to clinic today to establish primary care with me and for her routine medical exam.  Patient has not been taking any medications for her blood pressure, diastolic heart failure, kidney disease, knee pain, gastric reflux, for many years.  Patient is unable to give me an answer to why she stepped seeing her doctors and taking her medications.  She will need a refill on all medications today.  Blood pressure is 230/140.  Bilateral feet swelling is noted.  Bilateral lower extremity edema is noted.  Patient denies any chest pain and any shortness a breath at this time.  Patient's sister is present at this visit.  Patient's labs labs were done drawn in 2022.  She was diagnosed with chronic kidney disease stage 3.  We will have new labs drawn today.  We will send patient to the emergency room to help lower blood pressure.  Patient wishes to go to an emergency room in [facility].  Patient's sister states that she will bring her sister to the emergency room there.  There are no other complaints at this time.  I will see patient 2 weeks for follow-up visit.         Review of Systems   Cardiovascular:  Positive for leg swelling.   Musculoskeletal:  Positive for joint pain.        + bilateral knee pain   All 12 systems otherwise negative.       Family History:  family history includes Breast cancer in her sister; Cataracts in her mother; Colon cancer in her sister; No Known Problems in her brother, daughter, father, maternal aunt, maternal  grandfather, maternal grandmother, maternal uncle, paternal aunt, paternal grandfather, paternal grandmother, paternal uncle, and son. She was adopted.   Family history was reviewed with patient.     Medications:  Outpatient Encounter Medications as of 11/7/2024   Medication Sig Dispense Refill    aspirin (ECOTRIN) 81 MG EC tablet Take 81 mg by mouth once daily.      cetirizine (ZYRTEC) 10 MG tablet Take 1 tablet (10 mg total) by mouth once daily. 90 tablet 6    albuterol (PROVENTIL HFA) 90 mcg/actuation inhaler Inhale 2 puffs into the lungs every 6 (six) hours as needed for Wheezing. Rescue 90 g 3    amLODIPine (NORVASC) 10 MG tablet TAKE 1 TABLET(10 MG) BY MOUTH EVERY DAY. You are overdue for an apt. Please call to make one. 90 tablet 1    carvediloL (COREG) 6.25 MG tablet Take 1 tablet (6.25 mg total) by mouth 2 (two) times daily with meals. 180 tablet 1    famotidine (PEPCID) 20 MG tablet Take 1 tablet (20 mg total) by mouth 2 (two) times daily. 90 tablet 3    fluticasone-salmeterol diskus inhaler 100-50 mcg Inhale 1 puff into the lungs 2 (two) times daily. Controller 1 each 3    hydroCHLOROthiazide (HYDRODIURIL) 25 MG tablet Take 1 tablet (25 mg total) by mouth once daily. 90 tablet 3    magnesium 250 mg Tab Take by mouth once. (Patient not taking: Reported on 11/7/2024)      nitroGLYCERIN (NITROSTAT) 0.4 MG SL tablet Place 1 tablet (0.4 mg total) under the tongue every 5 (five) minutes as needed for Chest pain. (Patient not taking: Reported on 11/7/2024) 20 tablet 12    rosuvastatin (CRESTOR) 20 MG tablet Take 1 tablet (20 mg total) by mouth every evening. Generic allowed (Patient not taking: Reported on 11/7/2024) 90 tablet 3    tiZANidine (ZANAFLEX) 2 MG tablet TAKE 1 TABLET(2 MG) BY MOUTH EVERY EVENING FOR 10 DAYS (Patient not taking: Reported on 11/7/2024) 10 tablet 0    [DISCONTINUED] amLODIPine (NORVASC) 10 MG tablet TAKE 1 TABLET(10 MG) BY MOUTH EVERY DAY. You are overdue for an apt. Please call to make  "one. (Patient not taking: Reported on 11/7/2024) 30 tablet 0    [DISCONTINUED] carvedilol (COREG) 6.25 MG tablet Take 1 tablet (6.25 mg total) by mouth 2 (two) times daily with meals. (Patient not taking: Reported on 11/7/2024) 60 tablet 11    [DISCONTINUED] eplerenone (INSPRA) 25 MG Tab Take 1 tablet (25 mg total) by mouth once daily. (Patient not taking: Reported on 11/7/2024) 30 tablet 11    [DISCONTINUED] hydroCHLOROthiazide (HYDRODIURIL) 25 MG tablet TAKE 1 TABLET(25 MG) BY MOUTH EVERY DAY (Patient not taking: Reported on 11/7/2024) 90 tablet 3    [DISCONTINUED] ranitidine (ZANTAC) 150 MG tablet TAKE 1 TABLET(150 MG) BY MOUTH TWICE DAILY (Patient not taking: Reported on 11/7/2024) 60 tablet 0     No facility-administered encounter medications on file as of 11/7/2024.       Allergies:  Review of patient's allergies indicates:   Allergen Reactions    Eplerenone Itching    Lisinopril Other (See Comments)     cough       Health Maintenance:  Health Maintenance   Topic Date Due    Shingles Vaccine (1 of 2) Never done    Colonoscopy  06/24/2018    DEXA Scan  09/14/2018    Mammogram  11/20/2018    Lipid Panel  07/14/2027    TETANUS VACCINE  08/20/2029    Hepatitis C Screening  Completed     Health Maintenance Topics with due status: Not Due       Topic Last Completion Date    TETANUS VACCINE 08/20/2019    Lipid Panel 07/14/2022       Physical Exam      Vital Signs  Pulse: 87  SpO2: 96 %  BP: (!) 230/140  BP Location: Left arm  Patient Position: Sitting  Pain Score:   6  Pain Loc: Knee  Height and Weight  Height: 5' 3" (160 cm)  Weight: 62.5 kg (137 lb 12.6 oz)  BSA (Calculated - sq m): 1.67 sq meters  BMI (Calculated): 24.4  Weight in (lb) to have BMI = 25: 140.8]    Physical Exam  Vitals reviewed.   Constitutional:       Appearance: Normal appearance. She is normal weight.   HENT:      Head: Normocephalic and atraumatic.      Right Ear: Tympanic membrane, ear canal and external ear normal.      Left Ear: Tympanic " membrane, ear canal and external ear normal.      Nose: Nose normal.      Mouth/Throat:      Mouth: Mucous membranes are moist.      Pharynx: Oropharynx is clear.   Eyes:      Extraocular Movements: Extraocular movements intact.      Conjunctiva/sclera: Conjunctivae normal.      Pupils: Pupils are equal, round, and reactive to light.   Cardiovascular:      Rate and Rhythm: Normal rate and regular rhythm.      Pulses: Normal pulses.      Heart sounds: Normal heart sounds.   Pulmonary:      Effort: Pulmonary effort is normal.      Breath sounds: Normal breath sounds.   Abdominal:      General: Abdomen is flat. Bowel sounds are normal.      Palpations: Abdomen is soft.   Musculoskeletal:         General: Normal range of motion.      Cervical back: Normal range of motion and neck supple.   Skin:     General: Skin is warm and dry.      Capillary Refill: Capillary refill takes less than 2 seconds.   Neurological:      General: No focal deficit present.      Mental Status: She is alert and oriented to person, place, and time. Mental status is at baseline.   Psychiatric:         Mood and Affect: Mood normal.         Behavior: Behavior normal.         Thought Content: Thought content normal.         Judgment: Judgment normal.            Assessment/Plan     Sulema Burdick is a 79 y.o.female with:    Mixed hyperlipidemia  -     Lipid Panel; Future; Expected date: 11/07/2024    Stage 3 chronic kidney disease, unspecified whether stage 3a or 3b CKD    Vitamin D deficiency disease  -     Vitamin D; Future; Expected date: 12/07/2024    Other fatigue  -     CBC Auto Differential; Future; Expected date: 11/07/2024  -     Comprehensive Metabolic Panel; Future; Expected date: 11/07/2024  -     T4, Free; Future; Expected date: 11/07/2024  -     TSH; Future; Expected date: 11/07/2024    Pre-diabetes  -     Hemoglobin A1C; Future; Expected date: 11/07/2024    Encounter for screening for malignant neoplasm of breast, unspecified screening  modality  -     Mammo Digital Screening Bilat w/ Dm; Future; Expected date: 11/07/2024    Colon cancer screening  -     Ambulatory referral/consult to Endo Procedure ; Future; Expected date: 11/08/2024    Need for shingles vaccine    Need for influenza vaccination    Need for immunization against respiratory syncytial virus    Encounter for screening mammogram for malignant neoplasm of breast  -     Mammo Digital Screening Bilat w/ Dm; Future; Expected date: 11/07/2024    Osteopenia after menopause  -     DXA Bone Density Axial Skeleton 1 or more sites; Future; Expected date: 11/07/2024    Essential hypertension  -     amLODIPine (NORVASC) 10 MG tablet; TAKE 1 TABLET(10 MG) BY MOUTH EVERY DAY. You are overdue for an apt. Please call to make one.  Dispense: 90 tablet; Refill: 1  -     hydroCHLOROthiazide (HYDRODIURIL) 25 MG tablet; Take 1 tablet (25 mg total) by mouth once daily.  Dispense: 90 tablet; Refill: 3  -     carvediloL (COREG) 6.25 MG tablet; Take 1 tablet (6.25 mg total) by mouth 2 (two) times daily with meals.  Dispense: 180 tablet; Refill: 1    Gastric reflux  -     famotidine (PEPCID) 20 MG tablet; Take 1 tablet (20 mg total) by mouth 2 (two) times daily.  Dispense: 90 tablet; Refill: 3    Systolic dysfunction  -     carvediloL (COREG) 6.25 MG tablet; Take 1 tablet (6.25 mg total) by mouth 2 (two) times daily with meals.  Dispense: 180 tablet; Refill: 1    Intermittent asthma without complication, unspecified asthma severity  -     albuterol (PROVENTIL HFA) 90 mcg/actuation inhaler; Inhale 2 puffs into the lungs every 6 (six) hours as needed for Wheezing. Rescue  Dispense: 90 g; Refill: 3    Sleep apnea, unspecified type  -     Ambulatory referral/consult to Sleep Disorders; Future; Expected date: 11/14/2024    Pain in both knees, unspecified chronicity  -     Ambulatory referral/consult to Pain Clinic; Future; Expected date: 11/14/2024    Stage 4 chronic kidney disease  -     Ambulatory  referral/consult to Nephrology; Future; Expected date: 11/14/2024    Mild intermittent asthma without complication  -     fluticasone-salmeterol diskus inhaler 100-50 mcg; Inhale 1 puff into the lungs 2 (two) times daily. Controller  Dispense: 1 each; Refill: 3        As above, continue current medications and maintain follow up with specialists.  Return to clinic as needed.    Greater than 50% of visit was spent face to face with patient.  All questions were answered to patient's satisfaction.          Karen L Spencer, NP-C [facility]

## 2024-11-07 ENCOUNTER — LAB VISIT (OUTPATIENT)
Dept: LAB | Facility: HOSPITAL | Age: 79
End: 2024-11-07
Attending: NURSE PRACTITIONER
Payer: MEDICARE

## 2024-11-07 ENCOUNTER — OFFICE VISIT (OUTPATIENT)
Dept: PRIMARY CARE CLINIC | Facility: CLINIC | Age: 79
End: 2024-11-07
Payer: MEDICARE

## 2024-11-07 VITALS
BODY MASS INDEX: 24.42 KG/M2 | DIASTOLIC BLOOD PRESSURE: 140 MMHG | HEART RATE: 87 BPM | WEIGHT: 137.81 LBS | OXYGEN SATURATION: 96 % | SYSTOLIC BLOOD PRESSURE: 230 MMHG | HEIGHT: 63 IN

## 2024-11-07 DIAGNOSIS — N18.30 STAGE 3 CHRONIC KIDNEY DISEASE, UNSPECIFIED WHETHER STAGE 3A OR 3B CKD: ICD-10-CM

## 2024-11-07 DIAGNOSIS — G47.30 SLEEP APNEA, UNSPECIFIED TYPE: ICD-10-CM

## 2024-11-07 DIAGNOSIS — E78.2 MIXED HYPERLIPIDEMIA: ICD-10-CM

## 2024-11-07 DIAGNOSIS — M25.562 PAIN IN BOTH KNEES, UNSPECIFIED CHRONICITY: ICD-10-CM

## 2024-11-07 DIAGNOSIS — Z29.11 NEED FOR IMMUNIZATION AGAINST RESPIRATORY SYNCYTIAL VIRUS: ICD-10-CM

## 2024-11-07 DIAGNOSIS — J45.20 INTERMITTENT ASTHMA WITHOUT COMPLICATION, UNSPECIFIED ASTHMA SEVERITY: ICD-10-CM

## 2024-11-07 DIAGNOSIS — N18.4 STAGE 4 CHRONIC KIDNEY DISEASE: ICD-10-CM

## 2024-11-07 DIAGNOSIS — I51.9 SYSTOLIC DYSFUNCTION: ICD-10-CM

## 2024-11-07 DIAGNOSIS — R53.83 OTHER FATIGUE: ICD-10-CM

## 2024-11-07 DIAGNOSIS — M85.80 OSTEOPENIA AFTER MENOPAUSE: ICD-10-CM

## 2024-11-07 DIAGNOSIS — R73.03 PRE-DIABETES: ICD-10-CM

## 2024-11-07 DIAGNOSIS — E55.9 VITAMIN D DEFICIENCY DISEASE: ICD-10-CM

## 2024-11-07 DIAGNOSIS — J45.20 MILD INTERMITTENT ASTHMA WITHOUT COMPLICATION: ICD-10-CM

## 2024-11-07 DIAGNOSIS — K21.9 GASTRIC REFLUX: ICD-10-CM

## 2024-11-07 DIAGNOSIS — I10 ESSENTIAL HYPERTENSION: ICD-10-CM

## 2024-11-07 DIAGNOSIS — Z12.31 ENCOUNTER FOR SCREENING MAMMOGRAM FOR MALIGNANT NEOPLASM OF BREAST: ICD-10-CM

## 2024-11-07 DIAGNOSIS — Z12.39 ENCOUNTER FOR SCREENING FOR MALIGNANT NEOPLASM OF BREAST, UNSPECIFIED SCREENING MODALITY: ICD-10-CM

## 2024-11-07 DIAGNOSIS — Z23 NEED FOR SHINGLES VACCINE: ICD-10-CM

## 2024-11-07 DIAGNOSIS — M25.561 PAIN IN BOTH KNEES, UNSPECIFIED CHRONICITY: ICD-10-CM

## 2024-11-07 DIAGNOSIS — Z78.0 OSTEOPENIA AFTER MENOPAUSE: ICD-10-CM

## 2024-11-07 DIAGNOSIS — Z23 NEED FOR INFLUENZA VACCINATION: ICD-10-CM

## 2024-11-07 DIAGNOSIS — Z12.11 COLON CANCER SCREENING: ICD-10-CM

## 2024-11-07 DIAGNOSIS — E78.2 MIXED HYPERLIPIDEMIA: Primary | ICD-10-CM

## 2024-11-07 LAB
ALBUMIN SERPL BCP-MCNC: 3.5 G/DL (ref 3.5–5.2)
ALP SERPL-CCNC: 88 U/L (ref 40–150)
ALT SERPL W/O P-5'-P-CCNC: 17 U/L (ref 10–44)
ANION GAP SERPL CALC-SCNC: 14 MMOL/L (ref 8–16)
AST SERPL-CCNC: 20 U/L (ref 10–40)
BASOPHILS # BLD AUTO: 0.07 K/UL (ref 0–0.2)
BASOPHILS NFR BLD: 1.4 % (ref 0–1.9)
BILIRUB SERPL-MCNC: 0.5 MG/DL (ref 0.1–1)
BUN SERPL-MCNC: 64 MG/DL (ref 8–23)
CALCIUM SERPL-MCNC: 8.9 MG/DL (ref 8.7–10.5)
CHLORIDE SERPL-SCNC: 113 MMOL/L (ref 95–110)
CHOLEST SERPL-MCNC: 163 MG/DL (ref 120–199)
CHOLEST/HDLC SERPL: 3.6 {RATIO} (ref 2–5)
CO2 SERPL-SCNC: 15 MMOL/L (ref 23–29)
CREAT SERPL-MCNC: 5.4 MG/DL (ref 0.5–1.4)
DIFFERENTIAL METHOD BLD: ABNORMAL
EOSINOPHIL # BLD AUTO: 0.2 K/UL (ref 0–0.5)
EOSINOPHIL NFR BLD: 3 % (ref 0–8)
ERYTHROCYTE [DISTWIDTH] IN BLOOD BY AUTOMATED COUNT: 17.9 % (ref 11.5–14.5)
EST. GFR  (NO RACE VARIABLE): 7.6 ML/MIN/1.73 M^2
ESTIMATED AVG GLUCOSE: 100 MG/DL (ref 68–131)
GLUCOSE SERPL-MCNC: 82 MG/DL (ref 70–110)
HBA1C MFR BLD: 5.1 % (ref 4–5.6)
HCT VFR BLD AUTO: 31.2 % (ref 37–48.5)
HDLC SERPL-MCNC: 45 MG/DL (ref 40–75)
HDLC SERPL: 27.6 % (ref 20–50)
HGB BLD-MCNC: 9.5 G/DL (ref 12–16)
IMM GRANULOCYTES # BLD AUTO: 0.01 K/UL (ref 0–0.04)
IMM GRANULOCYTES NFR BLD AUTO: 0.2 % (ref 0–0.5)
LDLC SERPL CALC-MCNC: 102.8 MG/DL (ref 63–159)
LYMPHOCYTES # BLD AUTO: 1.8 K/UL (ref 1–4.8)
LYMPHOCYTES NFR BLD: 34.8 % (ref 18–48)
MCH RBC QN AUTO: 24.5 PG (ref 27–31)
MCHC RBC AUTO-ENTMCNC: 30.4 G/DL (ref 32–36)
MCV RBC AUTO: 81 FL (ref 82–98)
MONOCYTES # BLD AUTO: 0.4 K/UL (ref 0.3–1)
MONOCYTES NFR BLD: 6.9 % (ref 4–15)
NEUTROPHILS # BLD AUTO: 2.7 K/UL (ref 1.8–7.7)
NEUTROPHILS NFR BLD: 53.7 % (ref 38–73)
NONHDLC SERPL-MCNC: 118 MG/DL
NRBC BLD-RTO: 0 /100 WBC
PLATELET # BLD AUTO: 274 K/UL (ref 150–450)
PMV BLD AUTO: 12.1 FL (ref 9.2–12.9)
POTASSIUM SERPL-SCNC: 3.9 MMOL/L (ref 3.5–5.1)
PROT SERPL-MCNC: 6.8 G/DL (ref 6–8.4)
RBC # BLD AUTO: 3.87 M/UL (ref 4–5.4)
SODIUM SERPL-SCNC: 142 MMOL/L (ref 136–145)
T4 FREE SERPL-MCNC: 0.91 NG/DL (ref 0.71–1.51)
TRIGL SERPL-MCNC: 76 MG/DL (ref 30–150)
TSH SERPL DL<=0.005 MIU/L-ACNC: 1.95 UIU/ML (ref 0.4–4)
WBC # BLD AUTO: 5.06 K/UL (ref 3.9–12.7)

## 2024-11-07 PROCEDURE — 83036 HEMOGLOBIN GLYCOSYLATED A1C: CPT | Performed by: NURSE PRACTITIONER

## 2024-11-07 PROCEDURE — 84443 ASSAY THYROID STIM HORMONE: CPT | Performed by: NURSE PRACTITIONER

## 2024-11-07 PROCEDURE — 36415 COLL VENOUS BLD VENIPUNCTURE: CPT | Mod: PN | Performed by: NURSE PRACTITIONER

## 2024-11-07 PROCEDURE — 99999 PR PBB SHADOW E&M-EST. PATIENT-LVL V: CPT | Mod: PBBFAC,,, | Performed by: NURSE PRACTITIONER

## 2024-11-07 PROCEDURE — 80061 LIPID PANEL: CPT | Performed by: NURSE PRACTITIONER

## 2024-11-07 PROCEDURE — 80053 COMPREHEN METABOLIC PANEL: CPT | Performed by: NURSE PRACTITIONER

## 2024-11-07 PROCEDURE — 84439 ASSAY OF FREE THYROXINE: CPT | Performed by: NURSE PRACTITIONER

## 2024-11-07 PROCEDURE — 85025 COMPLETE CBC W/AUTO DIFF WBC: CPT | Performed by: NURSE PRACTITIONER

## 2024-11-07 RX ORDER — HYDROCHLOROTHIAZIDE 25 MG/1
25 TABLET ORAL DAILY
Qty: 90 TABLET | Refills: 3 | Status: SHIPPED | OUTPATIENT
Start: 2024-11-07

## 2024-11-07 RX ORDER — FLUTICASONE PROPIONATE AND SALMETEROL 100; 50 UG/1; UG/1
1 POWDER RESPIRATORY (INHALATION) 2 TIMES DAILY
Qty: 1 EACH | Refills: 3 | Status: SHIPPED | OUTPATIENT
Start: 2024-11-07 | End: 2025-11-07

## 2024-11-07 RX ORDER — FAMOTIDINE 20 MG/1
20 TABLET, FILM COATED ORAL 2 TIMES DAILY
Qty: 90 TABLET | Refills: 3 | Status: SHIPPED | OUTPATIENT
Start: 2024-11-07 | End: 2025-11-07

## 2024-11-07 RX ORDER — CARVEDILOL 6.25 MG/1
6.25 TABLET ORAL 2 TIMES DAILY WITH MEALS
Qty: 180 TABLET | Refills: 1 | Status: SHIPPED | OUTPATIENT
Start: 2024-11-07 | End: 2025-11-07

## 2024-11-07 RX ORDER — AMLODIPINE BESYLATE 10 MG/1
TABLET ORAL
Qty: 90 TABLET | Refills: 1 | Status: SHIPPED | OUTPATIENT
Start: 2024-11-07

## 2024-11-07 RX ORDER — ALBUTEROL SULFATE 90 UG/1
2 INHALANT RESPIRATORY (INHALATION) EVERY 6 HOURS PRN
Qty: 90 G | Refills: 3 | Status: SHIPPED | OUTPATIENT
Start: 2024-11-07

## 2024-11-11 ENCOUNTER — DOCUMENTATION ONLY (OUTPATIENT)
Dept: PRIMARY CARE CLINIC | Facility: CLINIC | Age: 79
End: 2024-11-11
Payer: MEDICARE

## 2024-11-11 NOTE — PROGRESS NOTES
Telephone patient's home telephone number, [phone].  No answer at this number.  Was unable to leave a voice message.  I then telephoned patient's daughter Nallely at telephone [phone].  I left a message for daughter or patient to return med telephone call.  This is in regards of her kidney function from her lab values.  Patient is in kidney failure.  Both calls were made on 11/11/2024 at 10:53 a.m..

## 2024-11-14 ENCOUNTER — HOSPITAL ENCOUNTER (OUTPATIENT)
Dept: RADIOLOGY | Facility: HOSPITAL | Age: 79
Discharge: HOME | End: 2024-11-14
Attending: NURSE PRACTITIONER
Payer: MEDICARE

## 2024-11-14 VITALS — WEIGHT: 137 LBS | HEIGHT: 63 IN | BODY MASS INDEX: 24.27 KG/M2

## 2024-11-14 DIAGNOSIS — M85.80 OSTEOPENIA AFTER MENOPAUSE: ICD-10-CM

## 2024-11-14 DIAGNOSIS — Z12.39 ENCOUNTER FOR SCREENING FOR MALIGNANT NEOPLASM OF BREAST, UNSPECIFIED SCREENING MODALITY: ICD-10-CM

## 2024-11-14 DIAGNOSIS — Z78.0 OSTEOPENIA AFTER MENOPAUSE: ICD-10-CM

## 2024-11-14 DIAGNOSIS — Z12.31 ENCOUNTER FOR SCREENING MAMMOGRAM FOR MALIGNANT NEOPLASM OF BREAST: ICD-10-CM

## 2024-11-14 PROCEDURE — 77080 DXA BONE DENSITY AXIAL: CPT | Mod: TC

## 2024-11-14 PROCEDURE — 77063 BREAST TOMOSYNTHESIS BI: CPT | Mod: TC

## 2024-11-25 ENCOUNTER — DOCUMENTATION ONLY (OUTPATIENT)
Dept: PRIMARY CARE CLINIC | Facility: CLINIC | Age: 79
End: 2024-11-25
Payer: MEDICARE

## 2024-11-25 ENCOUNTER — OFFICE VISIT (OUTPATIENT)
Dept: PAIN MEDICINE | Facility: CLINIC | Age: 79
End: 2024-11-25
Payer: MEDICARE

## 2024-11-25 ENCOUNTER — TELEPHONE (OUTPATIENT)
Dept: PAIN MEDICINE | Facility: CLINIC | Age: 79
End: 2024-11-25

## 2024-11-25 ENCOUNTER — HOSPITAL ENCOUNTER (OUTPATIENT)
Dept: RADIOLOGY | Facility: HOSPITAL | Age: 79
Discharge: HOME | End: 2024-11-25
Attending: EMERGENCY MEDICINE
Payer: MEDICARE

## 2024-11-25 VITALS
DIASTOLIC BLOOD PRESSURE: 113 MMHG | BODY MASS INDEX: 24.8 KG/M2 | HEIGHT: 63 IN | HEART RATE: 73 BPM | WEIGHT: 140 LBS | SYSTOLIC BLOOD PRESSURE: 228 MMHG

## 2024-11-25 DIAGNOSIS — M25.562 CHRONIC PAIN OF LEFT KNEE: Primary | ICD-10-CM

## 2024-11-25 DIAGNOSIS — M25.562 PAIN IN BOTH KNEES, UNSPECIFIED CHRONICITY: ICD-10-CM

## 2024-11-25 DIAGNOSIS — M25.561 CHRONIC PAIN OF RIGHT KNEE: ICD-10-CM

## 2024-11-25 DIAGNOSIS — G89.29 CHRONIC PAIN OF LEFT KNEE: Primary | ICD-10-CM

## 2024-11-25 DIAGNOSIS — M25.561 PAIN IN BOTH KNEES, UNSPECIFIED CHRONICITY: ICD-10-CM

## 2024-11-25 DIAGNOSIS — M17.0 PRIMARY OSTEOARTHRITIS OF BOTH KNEES: ICD-10-CM

## 2024-11-25 DIAGNOSIS — M25.562 CHRONIC PAIN OF LEFT KNEE: ICD-10-CM

## 2024-11-25 DIAGNOSIS — M17.0 PRIMARY OSTEOARTHRITIS OF BOTH KNEES: Primary | ICD-10-CM

## 2024-11-25 DIAGNOSIS — G89.29 CHRONIC PAIN OF LEFT KNEE: ICD-10-CM

## 2024-11-25 DIAGNOSIS — G89.29 CHRONIC PAIN OF RIGHT KNEE: ICD-10-CM

## 2024-11-25 PROCEDURE — 3077F SYST BP >= 140 MM HG: CPT | Mod: CPTII,S$GLB,, | Performed by: EMERGENCY MEDICINE

## 2024-11-25 PROCEDURE — 99999 PR PBB SHADOW E&M-EST. PATIENT-LVL III: CPT | Mod: PBBFAC,,, | Performed by: EMERGENCY MEDICINE

## 2024-11-25 PROCEDURE — 73562 X-RAY EXAM OF KNEE 3: CPT | Mod: TC,50

## 2024-11-25 PROCEDURE — 3080F DIAST BP >= 90 MM HG: CPT | Mod: CPTII,S$GLB,, | Performed by: EMERGENCY MEDICINE

## 2024-11-25 PROCEDURE — 1159F MED LIST DOCD IN RCRD: CPT | Mod: CPTII,S$GLB,, | Performed by: EMERGENCY MEDICINE

## 2024-11-25 PROCEDURE — 1125F AMNT PAIN NOTED PAIN PRSNT: CPT | Mod: CPTII,S$GLB,, | Performed by: EMERGENCY MEDICINE

## 2024-11-25 PROCEDURE — 3288F FALL RISK ASSESSMENT DOCD: CPT | Mod: CPTII,S$GLB,, | Performed by: EMERGENCY MEDICINE

## 2024-11-25 PROCEDURE — 73562 X-RAY EXAM OF KNEE 3: CPT | Mod: 26,50,, | Performed by: RADIOLOGY

## 2024-11-25 PROCEDURE — G2211 COMPLEX E/M VISIT ADD ON: HCPCS | Mod: S$GLB,,, | Performed by: EMERGENCY MEDICINE

## 2024-11-25 PROCEDURE — 1101F PT FALLS ASSESS-DOCD LE1/YR: CPT | Mod: CPTII,S$GLB,, | Performed by: EMERGENCY MEDICINE

## 2024-11-25 PROCEDURE — 99214 OFFICE O/P EST MOD 30 MIN: CPT | Mod: S$GLB,,, | Performed by: EMERGENCY MEDICINE

## 2024-11-25 NOTE — TELEPHONE ENCOUNTER
----- Message from Darlyn Prakash MD sent at 2024 11:31 AM CST -----  Regarding: Order for OLI PELLETIER    Patient Name: OLI PELLETIER([id number])  Sex: Female  : 1945      PCP: None    Center: [facility] CENTRAL BILLING OFFICE     Types of orders made on 2024: Imaging, Outpatient Referral, Procedure                                       Request    Order Date:2024  Ordering User:DARLYN PRAKASH [[id number]]  Encounter Provider:Darlyn Prakash MD [[id number]]  Authorizing Provider: Darlyn Prakash MD [[id number]]  Department:[facility][[id number]]    Common Order Information  Procedure -> Other (Specify location and laterality) Cmt: Bilateral GNB    Order Specific Information  Order: Procedure Order to Pain Management [Custom: [id number]]  Order #:          [id number]: 1 FUTURE    Priority: Routine  Class: Clinic Performed    Future Order Information      Expires on:2025            Expected by:2024                   Comment:IV sedation    Associated Diagnoses      M25.562, G89.29 Chronic pain of left knee      M25.561, G89.29 Chronic pain of right knee      M17.0 Primary osteoarthritis of both knees      Physician -> janeth         Facility Name: -> [facility]           Priority: Routine  Class: Clinic Performed    Future Order Information      Expires on:2025            Expected by:2024                   Comment:IV sedation    Associated Diagnoses      M25.562, G89.29 Chronic pain of left knee      M25.561, G89.29 Chronic pain of right knee      M17.0 Primary osteoarthritis of both knees      Procedure -> Other (Specify location and laterality) Cmt: Bilateral GNB        Physician -> janeth         Facility Name: -> [facility]

## 2024-11-25 NOTE — PROGRESS NOTES
I telephoned patient again at 12:53 on 11/25/24 in regards to hypertension after receiving a message from Dr. Prakash. Patient's BP in office was 228/113. Patient has not been taking antihypertensive medications.  No answer to telephone call and I was unable to leave a message.

## 2024-11-25 NOTE — H&P (VIEW-ONLY)
Interventional Pain Management - New Patient Visit      Original HPI 11/25/2024: Sulema Burdick is a 79 y.o. year old female patient who has a past medical history of Anemia, Arthritis, Asthma, CKD (chronic kidney disease) stage 3, GFR 30-59 ml/min, Hyperlipidemia, Hypertension, Obesity, Osteopenia, Renal abscess, Renal cysts, acquired, bilateral, and Sleep apnea. She presents in referral from Shanda Reddy for bilateral knee and hip pain.     Original Pain Description:  The pain is located in the left hip  and bilateral knee and is axial in nature. The pain is described as aching. Exacerbating factors: daily activities. Mitigating factors medications. Symptoms interfere with daily activity. The patient feels like symptoms have been unchanged. Patient denies significant motor weakness. She has had injections in her knees and her hips that have been getting less and less effective. She has had viscosupplementation. She doesn't want to get TKA or HUDSON as her family members have had complications after the replacements.     She also complains of left hip lateral pain that bothers her as well.     Patient denies any chest pain, shortness of breath, headache, nausea, vomiting, dizziness, new or unusual neurologic symptoms.  She reports bilateral pedal edema which is unchanged since her recent admission to the the hospital.    PAIN SCORES:  Best: Pain is 4  Current: Pain is 8  Worst: Pain is 9    6 weeks of Conservative therapy:  PT: Completed  Chiro:  HEP: Participating    Treatments / Medications:   Goody's OTC    Antiplatelets/Anticoagulants:  ASA    Interventional Pain Procedures:   NA    IMAGING:    X-ray knee three-view bilateral 08/01/12   No evidence of acute fracture or dislocation.  Bony mineralization is normal.  Soft tissues are unremarkable. Lateral view of the bilateral knee demonstrates no significant joint effusion.  There is tricompartment degenerative changes with narrowing and sclerosis.  There appear  to be posttraumatic changes of the right patella which appear chronic in nature.      Past Surgical History:   Procedure Laterality Date    BUNIONECTOMY      CATARACT EXTRACTION  7/8/2013    LEFT EYE    HYSTERECTOMY      BSO-@31yrs of age    OOPHORECTOMY      @31yrs of age       Social History     Socioeconomic History    Marital status:    Tobacco Use    Smoking status: Never    Smokeless tobacco: Never   Substance and Sexual Activity    Alcohol use: No    Drug use: No     Social Drivers of Health     Financial Resource Strain: Unknown (9/25/2019)    Received from [facility], [facility]    Overall Financial Resource Strain (CARDIA)     Difficulty of Paying Living Expenses: Patient declined   Food Insecurity: No Food Insecurity (11/8/2024)    Received from [facility]    Hunger Vital Sign     Worried About Running Out of Food in the Last Year: Never true     Ran Out of Food in the Last Year: Never true   Transportation Needs: No Transportation Needs (11/8/2024)    Received from [facility]    PRAPARE - Transportation     Lack of Transportation (Medical): No     Lack of Transportation (Non-Medical): No   Physical Activity: Unknown (9/25/2019)    Received from [facility] [place] [facility], [facility]    Exercise Vital Sign     Days of Exercise per Week: Patient declined     Minutes of Exercise per Session: Patient declined   Stress: Unknown (9/25/2019)    Received from [facility] [place] [facility], [facility]    [employer] [place] [employer] - Occupational Stress Questionnaire     Feeling of Stress : Patient declined   Housing Stability: Low Risk  (11/8/2024)    Received from [facility]    Housing Stability Vital Sign     Unable to Pay for Housing in the Last Year: No     Number of Times Moved in the Last Year: 1     Homeless in the Last Year: No       Medications/Allergies: See med card    ROS:  GENERAL: No fever. No chills. No fatigue. Denies weight loss. Denies weight gain.  Back / musculoskeletal / neuro : See HPI    VITALS:  "  Vitals:    11/25/24 1057   BP: (!) 228/113   Pulse: 73   Weight: 63.5 kg (139 lb 15.9 oz)   Height: 5' 3" (1.6 m)   PainSc:   8   PainLoc: Knee     Body mass index is 24.8 kg/m².      11/25/2024    11:06 AM   Last 3 PDI Scores   Pain Disability Index (PDI) 35       PHYSICAL EXAM:   GENERAL: Well appearing, in no acute distress, alert and oriented x3.  PSYCH:  Mood and affect appropriate.  SKIN: Skin color, texture, turgor normal, no rashes or lesions.  HEENT:  Normocephalic, atraumatic. Cranial nerves grossly intact.  NECK: No pain to palpation over the cervical paraspinous muscles. No pain to palpation over facets. No pain with neck flexion, extension, or lateral flexion.   PULM: No evidence of respiratory difficulty, symmetric chest rise.  GI:  Non-distended  EXTREMITIES:  2+ pitting edema bilateral lower extremities   MUSCULOSKELETAL:  Left GTB: Tenderness to palpation, reproduces symptoms with adduction.  Bilateral knees:  Valgus deformity slight, tenderness to palpation medial joint lines, tenderness to palpation medial infrapatellar region  NEURO: Sensation is equal and appropriate bilaterally. Bilateral upper and lower extremity strength is normal and symmetric. Bilateral upper and lower extremity coordination and muscle stretch reflexes are physiologic and symmetric. Plantar response are downgoing. Straight leg raising in the supine position is negative to radicular pain.   GAIT: normal.      LABS:    Lab Results   Component Value Date    HGBA1C 5.1 11/07/2024       Lab Results   Component Value Date    CREATININE 5.4 (H) 11/07/2024         ASSESSMENT: 79 y.o. year old female with pain, consistent with:    Encounter Diagnosis   Name Primary?    Pain in both knees, unspecified chronicity        DISCUSSION: Sulema Burdick is a patient with a history of uncontrolled hypertension who comes to us with left GTB and bilateral chronic knee pain unimproved with viscosupplementation and corticosteroids.      PLAN:  - " I have stressed the importance of physical activity and a home exercise plan to help with pain and improve health.  - Patient can continue with medications for now since they are providing benefits, using them appropriately, and without side effects.  - Counseled patient regarding the importance of activity modification and constant sleeping habits.  - Interventions:  Bilateral genicular nerve block . Explained the risks and benefits of the procedure in detail with the patient today in clinic along with alternative treatment options, and the patient elected to pursue the intervention at this time.    - Anticoagulation use: Yes aspirin  - Imaging: Reviewed available imaging with patient and answered any questions they had regarding study.  Will get updated knee views  - Discussed with the patient that although she has asymptomatic hypertension today, given the numbers they need to contact her PCP immediately for guidance.  Patient does not want to have to go to the emergency department again today as she states she was just discharged from the hospital for same.  Patient has not yet taken her blood pressure medications, she will take them and recheck her blood pressure after calling her PCP.  - consider left GTB injection  - The patient's pathophysiology was explained in detail with reference to x-rays, models, other visual aids as appropriate. We will update her knee x-rays as well  - Follow up visit: return to clinic in 3-4 weeks after procedure      I would like to thank Shanda Reddy NP for the opportunity to assist in the care of this patient. We had a very nice visit and I look forward to continuing their care. Please let me know if I can be of further assistance.     Peter Prakash MD  11/25/2024

## 2024-11-25 NOTE — PROGRESS NOTES
Interventional Pain Management - New Patient Visit      Original HPI 11/25/2024: Sulema Burdick is a 79 y.o. year old female patient who has a past medical history of Anemia, Arthritis, Asthma, CKD (chronic kidney disease) stage 3, GFR 30-59 ml/min, Hyperlipidemia, Hypertension, Obesity, Osteopenia, Renal abscess, Renal cysts, acquired, bilateral, and Sleep apnea. She presents in referral from Shanda Reddy for bilateral knee and hip pain.     Original Pain Description:  The pain is located in the left hip  and bilateral knee and is axial in nature. The pain is described as aching. Exacerbating factors: daily activities. Mitigating factors medications. Symptoms interfere with daily activity. The patient feels like symptoms have been unchanged. Patient denies significant motor weakness. She has had injections in her knees and her hips that have been getting less and less effective. She has had viscosupplementation. She doesn't want to get TKA or HUDSON as her family members have had complications after the replacements.     She also complains of left hip lateral pain that bothers her as well.     Patient denies any chest pain, shortness of breath, headache, nausea, vomiting, dizziness, new or unusual neurologic symptoms.  She reports bilateral pedal edema which is unchanged since her recent admission to the the hospital.    PAIN SCORES:  Best: Pain is 4  Current: Pain is 8  Worst: Pain is 9    6 weeks of Conservative therapy:  PT: Completed  Chiro:  HEP: Participating    Treatments / Medications:   Goody's OTC    Antiplatelets/Anticoagulants:  ASA    Interventional Pain Procedures:   NA    IMAGING:    X-ray knee three-view bilateral 08/01/12   No evidence of acute fracture or dislocation.  Bony mineralization is normal.  Soft tissues are unremarkable. Lateral view of the bilateral knee demonstrates no significant joint effusion.  There is tricompartment degenerative changes with narrowing and sclerosis.  There appear  to be posttraumatic changes of the right patella which appear chronic in nature.      Past Surgical History:   Procedure Laterality Date    BUNIONECTOMY      CATARACT EXTRACTION  7/8/2013    LEFT EYE    HYSTERECTOMY      BSO-@31yrs of age    OOPHORECTOMY      @31yrs of age       Social History     Socioeconomic History    Marital status:    Tobacco Use    Smoking status: Never    Smokeless tobacco: Never   Substance and Sexual Activity    Alcohol use: No    Drug use: No     Social Drivers of Health     Financial Resource Strain: Unknown (9/25/2019)    Received from [facility], [facility]    Overall Financial Resource Strain (CARDIA)     Difficulty of Paying Living Expenses: Patient declined   Food Insecurity: No Food Insecurity (11/8/2024)    Received from [facility]    Hunger Vital Sign     Worried About Running Out of Food in the Last Year: Never true     Ran Out of Food in the Last Year: Never true   Transportation Needs: No Transportation Needs (11/8/2024)    Received from [facility]    PRAPARE - Transportation     Lack of Transportation (Medical): No     Lack of Transportation (Non-Medical): No   Physical Activity: Unknown (9/25/2019)    Received from [facility], [facility]    Exercise Vital Sign     Days of Exercise per Week: Patient declined     Minutes of Exercise per Session: Patient declined   Stress: Unknown (9/25/2019)    Received from [facility] [place], [facility]    [employer] - Occupational Stress Questionnaire     Feeling of Stress : Patient declined   Housing Stability: Low Risk  (11/8/2024)    Received from [facility]    Housing Stability Vital Sign     Unable to Pay for Housing in the Last Year: No     Number of Times Moved in the Last Year: 1     Homeless in the Last Year: No       Medications/Allergies: See med card    ROS:  GENERAL: No fever. No chills. No fatigue. Denies weight loss. Denies weight gain.  Back / musculoskeletal / neuro : See HPI    VITALS:  "  Vitals:    11/25/24 1057   BP: (!) 228/113   Pulse: 73   Weight: 63.5 kg (139 lb 15.9 oz)   Height: 5' 3" (1.6 m)   PainSc:   8   PainLoc: Knee     Body mass index is 24.8 kg/m².      11/25/2024    11:06 AM   Last 3 PDI Scores   Pain Disability Index (PDI) 35       PHYSICAL EXAM:   GENERAL: Well appearing, in no acute distress, alert and oriented x3.  PSYCH:  Mood and affect appropriate.  SKIN: Skin color, texture, turgor normal, no rashes or lesions.  HEENT:  Normocephalic, atraumatic. Cranial nerves grossly intact.  NECK: No pain to palpation over the cervical paraspinous muscles. No pain to palpation over facets. No pain with neck flexion, extension, or lateral flexion.   PULM: No evidence of respiratory difficulty, symmetric chest rise.  GI:  Non-distended  EXTREMITIES:  2+ pitting edema bilateral lower extremities   MUSCULOSKELETAL:  Left GTB: Tenderness to palpation, reproduces symptoms with adduction.  Bilateral knees:  Valgus deformity slight, tenderness to palpation medial joint lines, tenderness to palpation medial infrapatellar region  NEURO: Sensation is equal and appropriate bilaterally. Bilateral upper and lower extremity strength is normal and symmetric. Bilateral upper and lower extremity coordination and muscle stretch reflexes are physiologic and symmetric. Plantar response are downgoing. Straight leg raising in the supine position is negative to radicular pain.   GAIT: normal.      LABS:    Lab Results   Component Value Date    HGBA1C 5.1 11/07/2024       Lab Results   Component Value Date    CREATININE 5.4 (H) 11/07/2024         ASSESSMENT: 79 y.o. year old female with pain, consistent with:    Encounter Diagnosis   Name Primary?    Pain in both knees, unspecified chronicity        DISCUSSION: Sulema Burdick is a patient with a history of uncontrolled hypertension who comes to us with left GTB and bilateral chronic knee pain unimproved with viscosupplementation and corticosteroids.      PLAN:  - " I have stressed the importance of physical activity and a home exercise plan to help with pain and improve health.  - Patient can continue with medications for now since they are providing benefits, using them appropriately, and without side effects.  - Counseled patient regarding the importance of activity modification and constant sleeping habits.  - Interventions:  Bilateral genicular nerve block . Explained the risks and benefits of the procedure in detail with the patient today in clinic along with alternative treatment options, and the patient elected to pursue the intervention at this time.    - Anticoagulation use: Yes aspirin  - Imaging: Reviewed available imaging with patient and answered any questions they had regarding study.  Will get updated knee views  - Discussed with the patient that although she has asymptomatic hypertension today, given the numbers they need to contact her PCP immediately for guidance.  Patient does not want to have to go to the emergency department again today as she states she was just discharged from the hospital for same.  Patient has not yet taken her blood pressure medications, she will take them and recheck her blood pressure after calling her PCP.  - consider left GTB injection  - The patient's pathophysiology was explained in detail with reference to x-rays, models, other visual aids as appropriate. We will update her knee x-rays as well  - Follow up visit: return to clinic in 3-4 weeks after procedure      I would like to thank Shanda Reddy NP for the opportunity to assist in the care of this patient. We had a very nice visit and I look forward to continuing their care. Please let me know if I can be of further assistance.     Peter Parkash MD  11/25/2024

## 2024-12-09 ENCOUNTER — TELEPHONE (OUTPATIENT)
Dept: PAIN MEDICINE | Facility: CLINIC | Age: 79
End: 2024-12-09
Payer: MEDICARE

## 2024-12-12 NOTE — PRE-PROCEDURE INSTRUCTIONS
Unable to reach pt via phone.  No answer.  PM sent as follows:    Dear Sulema,     Please read over the following pre-procedure instructions in it's entirety as there is helpful information here to get you well prepared for your upcoming procedure.     You are scheduled for a procedure with Dr. Prakash on 12/16/2024.     [facility] at the corner of [facility]. [facility] to [facility]. The address is: [address] [place] [address]. Take the elevator to the 2nd floor.       Registration check in time: 11:30 am  Procedure scheduled for time: 1:20 pm     If you are receiving sedation, you CANNOT drive yourself and must have a responsible friend or family member (no rideshare) to drive you home.        You should take any medications that you routinely take for blood pressure, heart medications, thyroid, cholesterol, etc.      The fasting restrictions are dependent on whether or not you are receiving sedation. Sedation is not available for all procedures.      Your fasting instructions/Sedation type are as follow:  IV sedation.   Nothing to eat after midnight the night prior to procedure.   Patients are encouraged to consume clear liquids up to 2 hours prior to scheduled arrival time.  -Clear liquids include Gatorade, water, soda, black coffee or tea (no milk or creamer), and clear juices. - Clear liquids do NOT include anything with pulp or food particles (chicken broth, ice cream, yogurt, Jello, etc.) You CANNOT drive yourself and must have a .           If you are on blood thinners, you need to follow the anticoagulation instructions that had been discussed previously. You should only stop the blood thinners if it was approved by your primary care physician or your cardiologist. In the event that you are not able to stop your blood thinners, a blood thinner was not listed on your medication list, or we were not able to get clearance from your  cardiologist, then the procedure may have to be postponed/canceled.      IF you were told to stop your blood thinners, this is how long you should generally hold some of the more common ones. Remember that stopping blood thinners is only necessary for certain procedures. If you are unsure of your instructions, please call us.   Aspirin - 5 days  Plavix/Clopidogrel - 7 days  Warfarin / Coumadin - 5 days  Eliquis - 3 days  Pradaxa/Dabigatran - 4 days  Xarelto/Rivaroxaban - 3 days     *If you take Ozempic, Trulicity, Mounjaro, Rybelsus, Zepbound Or other weight loss, non-insulin injections you must hold this for one week (7 days) prior if you are having IV sedation.      If you are a diabetic, do not take your medication if you will be fasting, but bring it with you. Please plan on being here for roughly 2-3 hours.      Please call us if any of the following have occurred:  *running fever or having any flu-like symptoms  *have been taking antibiotics in the past 2 weeks  *have had any or plan on having any immunizations in the 2 weeks before or after your injection(Flu/Shingles/Covid Booster/Pneumonia, etc.)  *had any out patient procedures other than with us in the past 2 weeks (Colonoscopy, Endoscopy, Biopsy, OBGYN, Dental, etc.) Or received a steroid injection from another provider within the last 2 weeks.  *have any wounds or rashes  *awaiting ANY test results that could result in you having to take antibiotics (Urine Culture, Flu/Covid/Strept Swab, etc)     If you have been COVID positive, you will need to hold off on your procedure until you are symptom free for 10 days. If you did not have any symptoms, you can have your procedure 10 days from your positive test result.      On the morning of your procedure:  *HOLD ALL VITAMINS, MINERALS, HERBS (INCLUDING HERBAL TEAS) AND SUPPLEMENTS  *SHOWER WITH ANTIBACTERIAL SOAP (example: DIAL over the counter) NIGHT BEFORE AND MORNING OF PROCEDURE  *DO NOT APPLY ANY  LOTIONS, OILS, POWDERS, PERFUME/COLOGNE, OINTMENTS, GELS, CREAMS, MAKEUP OR DEODORANT TO YOUR SKIN MORNING OF PROCEDURE  *LEAVE JEWELRY AND ANY VALUABLES AT HOME  *WEAR LOOSE COMFORTABLE CLOTHING      If you have any questions please call [phone].    Please reply to this portal message as receipt of delivery.     Thank you,  [facility]  [place], LPN [facility]  Pre-Admit

## 2024-12-16 ENCOUNTER — HOSPITAL ENCOUNTER (OUTPATIENT)
Facility: HOSPITAL | Age: 79
Discharge: HOME | End: 2024-12-16
Attending: EMERGENCY MEDICINE | Admitting: EMERGENCY MEDICINE
Payer: MEDICARE

## 2024-12-16 VITALS
WEIGHT: 139 LBS | OXYGEN SATURATION: 99 % | DIASTOLIC BLOOD PRESSURE: 127 MMHG | BODY MASS INDEX: 24.63 KG/M2 | HEART RATE: 76 BPM | SYSTOLIC BLOOD PRESSURE: 243 MMHG | HEIGHT: 63 IN | TEMPERATURE: 99 F | RESPIRATION RATE: 18 BRPM

## 2024-12-16 DIAGNOSIS — G89.29 CHRONIC PAIN: ICD-10-CM

## 2024-12-16 PROCEDURE — 64454 NJX AA&/STRD GNCLR NRV BRNCH: CPT | Mod: 50 | Performed by: EMERGENCY MEDICINE

## 2024-12-16 PROCEDURE — 99152 MOD SED SAME PHYS/QHP 5/>YRS: CPT | Mod: ,,, | Performed by: EMERGENCY MEDICINE

## 2024-12-16 PROCEDURE — 64454 NJX AA&/STRD GNCLR NRV BRNCH: CPT | Mod: 50,,, | Performed by: EMERGENCY MEDICINE

## 2024-12-16 PROCEDURE — 63600175 PHARM REV CODE 636 W HCPCS: Performed by: EMERGENCY MEDICINE

## 2024-12-16 PROCEDURE — 99152 MOD SED SAME PHYS/QHP 5/>YRS: CPT | Performed by: EMERGENCY MEDICINE

## 2024-12-16 RX ORDER — MIDAZOLAM HYDROCHLORIDE 1 MG/ML
INJECTION, SOLUTION INTRAMUSCULAR; INTRAVENOUS
Status: DISCONTINUED | OUTPATIENT
Start: 2024-12-16 | End: 2024-12-16 | Stop reason: HOSPADM

## 2024-12-16 RX ORDER — LIDOCAINE HYDROCHLORIDE 20 MG/ML
INJECTION, SOLUTION EPIDURAL; INFILTRATION; INTRACAUDAL; PERINEURAL
Status: DISCONTINUED | OUTPATIENT
Start: 2024-12-16 | End: 2024-12-16 | Stop reason: HOSPADM

## 2024-12-16 RX ORDER — BUPIVACAINE HYDROCHLORIDE 2.5 MG/ML
INJECTION, SOLUTION EPIDURAL; INFILTRATION; INTRACAUDAL
Status: DISCONTINUED | OUTPATIENT
Start: 2024-12-16 | End: 2024-12-16 | Stop reason: HOSPADM

## 2024-12-16 NOTE — DISCHARGE SUMMARY
Discharge Note  Short Stay      SUMMARY     Admit Date: 12/16/2024    Attending Physician: Peter Prakash      Discharge Physician: Peter Prakash      Discharge Date: 12/16/2024 2:01 PM    Procedure(s) (LRB):  Bilateral GNB (Bilateral)    Final Diagnosis: Primary osteoarthritis of both knees [M17.0]    Disposition: Home or self care    Patient Instructions:   Current Discharge Medication List        CONTINUE these medications which have NOT CHANGED    Details   albuterol (PROVENTIL HFA) 90 mcg/actuation inhaler Inhale 2 puffs into the lungs every 6 (six) hours as needed for Wheezing. Rescue  Qty: 90 g, Refills: 3    Associated Diagnoses: Intermittent asthma without complication, unspecified asthma severity      amLODIPine (NORVASC) 10 MG tablet TAKE 1 TABLET(10 MG) BY MOUTH EVERY DAY. You are overdue for an apt. Please call to make one.  Qty: 90 tablet, Refills: 1    Comments: She is overdue for an apt. Please have her call to make one  Associated Diagnoses: Essential hypertension      carvediloL (COREG) 6.25 MG tablet Take 1 tablet (6.25 mg total) by mouth 2 (two) times daily with meals.  Qty: 180 tablet, Refills: 1    Comments: .  Associated Diagnoses: Essential hypertension; Systolic dysfunction      fluticasone-salmeterol diskus inhaler 100-50 mcg Inhale 1 puff into the lungs 2 (two) times daily. Controller  Qty: 1 each, Refills: 3    Associated Diagnoses: Mild intermittent asthma without complication      hydroCHLOROthiazide (HYDRODIURIL) 25 MG tablet Take 1 tablet (25 mg total) by mouth once daily.  Qty: 90 tablet, Refills: 3    Comments: .  Associated Diagnoses: Essential hypertension      aspirin (ECOTRIN) 81 MG EC tablet Take 81 mg by mouth once daily.      cetirizine (ZYRTEC) 10 MG tablet Take 1 tablet (10 mg total) by mouth once daily.  Qty: 90 tablet, Refills: 6    Associated Diagnoses: Itching      famotidine (PEPCID) 20 MG tablet Take 1 tablet (20 mg total) by mouth 2 (two) times daily.  Qty: 90  tablet, Refills: 3    Associated Diagnoses: Gastric reflux      magnesium 250 mg Tab Take by mouth once.      nitroGLYCERIN (NITROSTAT) 0.4 MG SL tablet Place 1 tablet (0.4 mg total) under the tongue every 5 (five) minutes as needed for Chest pain.  Qty: 20 tablet, Refills: 12    Associated Diagnoses: Essential hypertension      rosuvastatin (CRESTOR) 20 MG tablet Take 1 tablet (20 mg total) by mouth every evening. Generic allowed  Qty: 90 tablet, Refills: 3    Associated Diagnoses: Mixed hyperlipidemia      tiZANidine (ZANAFLEX) 2 MG tablet TAKE 1 TABLET(2 MG) BY MOUTH EVERY EVENING FOR 10 DAYS  Qty: 10 tablet, Refills: 0    Comments: **Patient requests 90 days supply**  Associated Diagnoses: Cervicogenic headache                 Discharge Diagnosis: Primary osteoarthritis of both knees [M17.0]  Condition on Discharge: Stable with no complications to procedure   Diet on Discharge: Same as before.  Activity: as per instruction sheet.  Discharge to: Home with a responsible adult.  Follow up: 2-4 weeks       Please call my office or pager at [phone] if experienced any weakness or loss of sensation, fever > 101.5, pain uncontrolled with oral medications, persistent nausea/vomiting/or diarrhea, redness or drainage from the incisions, or any other worrisome concerns. If physician on call was not reached or could not communicate with our office for any reason please go to the nearest emergency department

## 2024-12-16 NOTE — OP NOTE
Diagnostic Genicular Nerve Block under Fluoroscopic Guidance    The procedure, risks, benefits, and options were discussed with the patient. There are no contraindications to the procedure. The patent expressed understanding and agreed to the procedure. Informed written consent was obtained prior to the start of the procedure and can be found in the patient's chart.    PATIENT NAME: Sulema Burdick   MRN: [id number]     DATE OF PROCEDURE: 12/16/2024    PROCEDURE: Diagnostic Bilateral Genicular Nerve Block under Fluoroscopic Guidance    PRE-OP DIAGNOSIS: Primary osteoarthritis of both knees [M17.0]    POST-OP DIAGNOSIS: Same    PHYSICIAN: Peter Prakash MD    MEDICATIONS INJECTED: Bupivacine 0.25%     LOCAL ANESTHETIC INJECTED: Xylocaine 2%     SEDATION: Versed 2mg and Fentanyl 0mcg                                                                                                                                                                                     Conscious sedation ordered by M.D. Patient re-evaluation prior to administration of conscious sedation. No changes noted in patient's status from initial evaluation. The patient's vital signs were monitored by RN and patient remained hemodynamically stable throughout the procedure.    Event Time In   Sedation Start 1342   Sedation End 1358       ESTIMATED BLOOD LOSS: None    COMPLICATIONS: None    INTERVAL HISTORY: Patient has clinical findings of chronic knee pain that is not relieved by other therapies.     TECHNIQUE: Time-out was performed to identify the patient and procedure to be performed. With the patient laying in a supine position, the surgical area was prepped and draped in the usual sterile fashion using ChloraPrep and a fenestrated drape. Three target sites including the superior lateral genicular nerve where the lateral femoral shaft meets the epicondyle, the superior medial genicular nerve where the medial femoral shaft meets the epicondyle, and the  inferior medial genicular nerve where the medial tibial shaft meets the epicondyle, were determined under fluoroscopy guidance. Skin anesthesia was achieved by injecting Lidocaine 2% over the injection sites. A 25 gauge, 3.5 inch spinal quinke needle was then advanced under fluoroscopy in the AP and lateral views into the positions of the geniculate nerves at each site. Once the needle tip position was confirmed on fluoroscopy, negative pressure was applied to confirm no intravascular placement. Contrast dye  Omnipaque (300mg/mL) was injected to confirm placement and there was no vascular runoff. 1 mL of the anesthetic listed above was then slowly injected at each site. The needles were removed and bleeding was nil. A sterile dressing was applied. No specimens collected. The patient tolerated the procedure well.     The patient was monitored after the procedure in the recovery area. They were given post-procedure and discharge instructions to follow at home. The patient was discharged in a stable condition.    Peter Prakash MD

## 2024-12-16 NOTE — PLAN OF CARE
Pt Resting in bed no s/s of distress RR even and unlabored VSS. Discharge instructions given and pt verbalized. IV D/C. Pt denies numbness or tingling in legs. Able to stand with steady gait. Pt ready for discharge home with family.

## 2024-12-16 NOTE — PLAN OF CARE
MD Prakash notified and at bedside assessing pt; will proceed with procedure; no new orders at this time.

## 2024-12-16 NOTE — DISCHARGE INSTRUCTIONS
[facility]  Dr. Peter Prakash  Messaging service # [phone]    GENICULAR BRANCH BLOCK POST-PROCEDURE INSTRUCTIONS:    What you need to do:  Keep a record of your response to the injection you had today.  It is very important that you accurately record how you responded to today's injection.  Please try to separate any soreness from the needle from your usual pain.  We want to know how this affects your usual pain.  Also REMEMBER that today's injection is a DIAGNOSTIC block, the pain relief will only last for a few hours to a few days.  Insurance requires 2 of these blocks before progressing to the ablation.  The sole purpose of this injection is to give us information, and not to treat your pain for an extended period.    Think about the amount of pain that you would have doing the most painful activities (I.e. bending, twisting, walking, standing straight, cleaning, etc...).  Now do those same activities as soon as you get home from the hospital.  Think about how much better you feel doing those activities now that the injection is done.  Does it feel 50%, 80%, 100% better than it would have otherwise?  This is the number you need to send me.    Send me a message through [facility] or leave a message at the phone number above telling me what % of improvement you got from the injection.    If you have difficulty putting a percentage on your pain relief fill this out:  (Rate each question below from 0-10 with 0 being no pain and 10 being the worst imaginable pain)    How bad would your pain get during activities like walking/going up stairs BEFORE the injection? _______    For the first 8 hours AFTER the injection, when you did those same painful activities, what was you best pain score? ____________    Send me those two numbers if you aren't able to give a  percentage.  ---------------------------------------------------------------------------------------------------------------------------------------------------------------------------------------------  What to watch out for:    If you experience any of the following symptoms after your procedure, please notify the messaging service immediately (see above for contact information):   fever (increased oral temperature)   bleeding or swelling at the injection site,    drainage, rash or redness at the injection site    possible signs of infection    increased pain at the injection site   worsening of your usual pain   severe headache   new or worsening numbness    new arm and/or leg weakness, or    changes in bowel and/or bladder function: urinating or defecating on yourself and not knowing that you did it.    PLEASE FOLLOW ALL INSTRUCTIONS CAREFULLY     Do not engage in strenuous activity (e.g., lifting or pushing heavy objects or repeated bending) for 24 hours.     Do not take a bath, swim or use Jacuzzi for 24 hours after procedure. (A shower is fine).   Remove any Band-Aids when you get home.    Use cold/ice, as needed for comfort.  We recommend the use of cold therapy alternating on for 20 minutes, off for 20 minutes.    Do not apply direct heat (heating pad or heat packs) to the injection site for 24 hours.     Resume your usual medications, unless instructed otherwise by your Pain Physician.     If you are on warfarin (Coumadin) or other blood thinner, resume this medication as instructed by your prescribing Physician.    IF AT ANY POINT YOU ARE VERY CONCERNED ABOUT YOUR SYMPTOMS, Urgent or emergent issues after between 5pm and 7am or on weekends, please contact the Doctor on call at [phone].    If you develop worsening pain, weakness, numbness, lose bowel or bladder control (i.e., having an accident where you did not even know you had to go to the bathroom and suddenly noticed you soiled yourself), saddle  anesthesia (a loss of sensation restricted to the area of the buttocks, anus and between the legs -- i.e., those parts of your body that would touch a saddle if you were sitting on one) you need to go immediately to the emergency department for evaluation and treatment.    ----------------------------------------------------------------------------------------------------------------------------------------------------------------  If you received Sedation please read the following instructions:  POST SEDATION INSTRUCTIONS    Today you received intravenous medication (also known as sedation) that was used to help you relax and/or decrease discomfort during your procedure. This medication will be acting in your body for the next 24 hours, so you might feel a little tired or sleepy. This feeling will slowly wear off.   Common side effects associated with these medications include: drowsiness, dizziness, sleepiness, confusion, feeling excited, difficulty remembering things, lack of steadiness with walking or balance, loss of fine muscle control, slowed reflexes, difficulty focusing, and blurred vision.  Some over-the-counter and prescription medications (e.g., muscle relaxants, opioids, mood-altering medications, sedatives/hypnotics, antihistamines) can interact with the intravenous medication you received and cause an increased risk of the side effects listed above in addition to other potentially life threatening side effects. Use extreme caution if you are taking such medications, and consult with your Pain Physician or prescribing physician if you have any questions.  For the next 12-24 hours:    DO NOT--Drive a car, operate machinery or power tools   DO NOT--Drink any alcoholic beverages (not even beer), they may dangerously increase the risk of side effects.    DO NOT--Make any important legal or business decisions or sign important documents.  We advise you to have someone to assist you at home. Move slowly and  carefully. Do not make sudden changes in position. Be aware of dizziness or light-headedness and move accordingly.   If you seek medical treatment within 24 hours, let the nurse or doctor caring for you know that you have received the above medications. If you have any questions or concerns related to your sedation or treatment today please contact us.

## 2024-12-17 ENCOUNTER — LAB VISIT (OUTPATIENT)
Dept: LAB | Facility: HOSPITAL | Age: 79
End: 2024-12-17
Payer: MEDICARE

## 2024-12-17 ENCOUNTER — OFFICE VISIT (OUTPATIENT)
Dept: NEPHROLOGY | Facility: CLINIC | Age: 79
End: 2024-12-17
Payer: MEDICARE

## 2024-12-17 VITALS
SYSTOLIC BLOOD PRESSURE: 182 MMHG | OXYGEN SATURATION: 96 % | DIASTOLIC BLOOD PRESSURE: 89 MMHG | WEIGHT: 127.88 LBS | BODY MASS INDEX: 22.65 KG/M2

## 2024-12-17 DIAGNOSIS — N18.5 CKD (CHRONIC KIDNEY DISEASE), STAGE V: Primary | ICD-10-CM

## 2024-12-17 DIAGNOSIS — N18.5 CKD (CHRONIC KIDNEY DISEASE), STAGE V: ICD-10-CM

## 2024-12-17 DIAGNOSIS — N25.81 SECONDARY HYPERPARATHYROIDISM OF RENAL ORIGIN: ICD-10-CM

## 2024-12-17 DIAGNOSIS — R80.9 PROTEINURIA, UNSPECIFIED TYPE: ICD-10-CM

## 2024-12-17 DIAGNOSIS — I10 HYPERTENSION, UNSPECIFIED TYPE: ICD-10-CM

## 2024-12-17 DIAGNOSIS — E87.20 ACIDOSIS: ICD-10-CM

## 2024-12-17 LAB
25(OH)D3+25(OH)D2 SERPL-MCNC: 18 NG/ML (ref 30–96)
ALBUMIN SERPL BCP-MCNC: 3.8 G/DL (ref 3.5–5.2)
ANION GAP SERPL CALC-SCNC: 13 MMOL/L (ref 8–16)
BUN SERPL-MCNC: 44 MG/DL (ref 8–23)
CALCIUM SERPL-MCNC: 8.2 MG/DL (ref 8.7–10.5)
CHLORIDE SERPL-SCNC: 110 MMOL/L (ref 95–110)
CO2 SERPL-SCNC: 17 MMOL/L (ref 23–29)
CREAT SERPL-MCNC: 5.4 MG/DL (ref 0.5–1.4)
ERYTHROCYTE [DISTWIDTH] IN BLOOD BY AUTOMATED COUNT: 17.9 % (ref 11.5–14.5)
EST. GFR  (NO RACE VARIABLE): 7.6 ML/MIN/1.73 M^2
GLUCOSE SERPL-MCNC: 81 MG/DL (ref 70–110)
HCT VFR BLD AUTO: 28.8 % (ref 37–48.5)
HGB BLD-MCNC: 9.1 G/DL (ref 12–16)
MCH RBC QN AUTO: 24.7 PG (ref 27–31)
MCHC RBC AUTO-ENTMCNC: 31.6 G/DL (ref 32–36)
MCV RBC AUTO: 78 FL (ref 82–98)
PHOSPHATE SERPL-MCNC: 4.2 MG/DL (ref 2.7–4.5)
PLATELET # BLD AUTO: 287 K/UL (ref 150–450)
PMV BLD AUTO: 10.3 FL (ref 9.2–12.9)
POTASSIUM SERPL-SCNC: 3.6 MMOL/L (ref 3.5–5.1)
PTH-INTACT SERPL-MCNC: 1044 PG/ML (ref 9–77)
RBC # BLD AUTO: 3.69 M/UL (ref 4–5.4)
SODIUM SERPL-SCNC: 140 MMOL/L (ref 136–145)
WBC # BLD AUTO: 5.54 K/UL (ref 3.9–12.7)

## 2024-12-17 PROCEDURE — 1101F PT FALLS ASSESS-DOCD LE1/YR: CPT | Mod: CPTII,S$GLB,, | Performed by: NURSE PRACTITIONER

## 2024-12-17 PROCEDURE — 3288F FALL RISK ASSESSMENT DOCD: CPT | Mod: CPTII,S$GLB,, | Performed by: NURSE PRACTITIONER

## 2024-12-17 PROCEDURE — 1159F MED LIST DOCD IN RCRD: CPT | Mod: CPTII,S$GLB,, | Performed by: NURSE PRACTITIONER

## 2024-12-17 PROCEDURE — 80069 RENAL FUNCTION PANEL: CPT | Performed by: NURSE PRACTITIONER

## 2024-12-17 PROCEDURE — 99999 PR PBB SHADOW E&M-EST. PATIENT-LVL III: CPT | Mod: PBBFAC,,, | Performed by: NURSE PRACTITIONER

## 2024-12-17 PROCEDURE — 36415 COLL VENOUS BLD VENIPUNCTURE: CPT | Performed by: NURSE PRACTITIONER

## 2024-12-17 PROCEDURE — 3077F SYST BP >= 140 MM HG: CPT | Mod: CPTII,S$GLB,, | Performed by: NURSE PRACTITIONER

## 2024-12-17 PROCEDURE — 83970 ASSAY OF PARATHORMONE: CPT | Performed by: NURSE PRACTITIONER

## 2024-12-17 PROCEDURE — 99205 OFFICE O/P NEW HI 60 MIN: CPT | Mod: S$GLB,,, | Performed by: NURSE PRACTITIONER

## 2024-12-17 PROCEDURE — 85027 COMPLETE CBC AUTOMATED: CPT | Performed by: NURSE PRACTITIONER

## 2024-12-17 PROCEDURE — 82306 VITAMIN D 25 HYDROXY: CPT | Performed by: NURSE PRACTITIONER

## 2024-12-17 PROCEDURE — 1126F AMNT PAIN NOTED NONE PRSNT: CPT | Mod: CPTII,S$GLB,, | Performed by: NURSE PRACTITIONER

## 2024-12-17 PROCEDURE — 3079F DIAST BP 80-89 MM HG: CPT | Mod: CPTII,S$GLB,, | Performed by: NURSE PRACTITIONER

## 2024-12-17 RX ORDER — CARVEDILOL 6.25 MG/1
6.25 TABLET ORAL 2 TIMES DAILY WITH MEALS
Qty: 180 TABLET | Refills: 3 | Status: SHIPPED | OUTPATIENT
Start: 2024-12-17 | End: 2024-12-17 | Stop reason: SDUPTHER

## 2024-12-17 NOTE — PROGRESS NOTES
Subjective:       Patient ID: Sulema Burdick is a 79 y.o.  female who presents for evaluation of CKD 4.      HPI     Sulema Burdick is a 79 year old female with HTN, HLD, diastolic dysfunction, NEVIN on CPAP that presents for evaluation of CKD 4 per referral from primary care provider. Patient was first seen by Dr. Stafford with [facility] in June 2018 for CKD 3, hypokalemia, HTN. She was then seen by Dr. Rizvi [facility] in October of 2018. Work up for hyperaldosteronism was unremarkable, although, patient previously taking eplerenone. She was seen by [facility] nephrology in 2022 for CKD 4 and HTN.     She was recently hospitalized 11/7/24 at [facility] for hypertensive emergency and acute renal failure. She was seen by nephrology at this time and dialysis discussions were initiated. Cr ranging ~5, eGFR 8 mL/min at discharge. Her BP continues to be severely uncontrolled with it ranging 240/120s at pain management visit yesterday. Today somewhat improved to 182/89 due to taking previous prescription of amlodipine 10mg in addition to nifedipine 60 BID and hydralazine 50 TID. She is not taking prescribed carvedilol. She reports compliance with sodium bicarbonate 650 BID. Since discharge, her left leg has been swollen. No erythema or pain. She denies fatigue, change in taste, change in appetite, n/v/d, sob, difficulty urinating, dysuria, hematuria, or reduced UOP.     Significant family hx includes: No known history     Last renal US: 11/8/24 , reviewed.  Findings:   The right kidney measures 6.8 cm in length. Simple right renal cyst measuring 14 mm.   The left kidney measures 5.9 cm in length. No focal left renal lesion.   No hydronephrosis. Mild increased parenchymal echogenicity of both kidneys.           Current medication list:   Hydralazine 50mg TID  Ferrous gluconate 324mg once daily with breakfast  Nifedipine 60mg BID  Amlodipine 10mg qd   Sodium bicarbonate 650 mg BID          Review of Systems   Constitutional:  Negative for appetite change and fatigue.   Respiratory:  Negative for shortness of breath.    Cardiovascular:  Positive for leg swelling (LLE).   Gastrointestinal:  Negative for diarrhea, nausea and vomiting.   Genitourinary:  Negative for decreased urine volume, difficulty urinating, dysuria and hematuria.   Musculoskeletal:  Positive for arthralgias (knees).   All other systems reviewed and are negative.    Objective:       Blood pressure (!) 182/89, weight 58 kg (127 lb 13.9 oz), SpO2 96%.  Physical Exam  Vitals reviewed.   Constitutional:       General: She is not in acute distress.     Appearance: Normal appearance.   HENT:      Head: Normocephalic and atraumatic.   Eyes:      General: No scleral icterus.  Cardiovascular:      Rate and Rhythm: Normal rate.   Pulmonary:      Effort: Pulmonary effort is normal. No respiratory distress.   Musculoskeletal:      Left lower leg: Edema present.   Skin:     General: Skin is warm and dry.   Neurological:      Mental Status: She is alert and oriented to person, place, and time.   Psychiatric:         Mood and Affect: Mood normal.         Behavior: Behavior normal.           Current Outpatient Medications   Medication Sig    albuterol (PROVENTIL HFA) 90 mcg/actuation inhaler Inhale 2 puffs into the lungs every 6 (six) hours as needed for Wheezing. Rescue    amLODIPine (NORVASC) 10 MG tablet TAKE 1 TABLET(10 MG) BY MOUTH EVERY DAY. You are overdue for an apt. Please call to make one.    aspirin (ECOTRIN) 81 MG EC tablet Take 81 mg by mouth once daily.    carvediloL (COREG) 6.25 MG tablet Take 1 tablet (6.25 mg total) by mouth 2 (two) times daily with meals.    famotidine (PEPCID) 20 MG tablet Take 1 tablet (20 mg total) by mouth 2 (two) times daily.    fluticasone-salmeterol diskus inhaler 100-50 mcg Inhale 1 puff into the lungs 2 (two) times daily. Controller    hydroCHLOROthiazide (HYDRODIURIL) 25 MG tablet Take 1 tablet (25  mg total) by mouth once daily.    cetirizine (ZYRTEC) 10 MG tablet Take 1 tablet (10 mg total) by mouth once daily.    magnesium 250 mg Tab Take by mouth once. (Patient not taking: Reported on 12/17/2024)    nitroGLYCERIN (NITROSTAT) 0.4 MG SL tablet Place 1 tablet (0.4 mg total) under the tongue every 5 (five) minutes as needed for Chest pain. (Patient not taking: Reported on 11/7/2024)    rosuvastatin (CRESTOR) 20 MG tablet Take 1 tablet (20 mg total) by mouth every evening. Generic allowed (Patient not taking: Reported on 12/17/2024)    tiZANidine (ZANAFLEX) 2 MG tablet TAKE 1 TABLET(2 MG) BY MOUTH EVERY EVENING FOR 10 DAYS (Patient not taking: Reported on 12/17/2024)   Last reviewed on 12/17/2024  2:49 PM by Patria Templeton, MA       Lab Results   Component Value Date    CREATININE 5.4 (H) 11/07/2024    URICACID 9.0 (H) 10/29/2018     eGFR   Date Value Ref Range Status   11/07/2024 7.6 (A) >60 mL/min/1.73 m^2 Final     Prot/Creat Ratio, Urine   Date Value Ref Range Status   04/19/2018 0.26 (H) 0.00 - 0.20 Final   09/09/2015 0.2 0.0 - 0.2 Final     Microalb/Creat Ratio   Date Value Ref Range Status   10/20/2017 154.9 (H) 0.0 - 30.0 ug/mg Final     Lab Results   Component Value Date     11/07/2024    K 3.9 11/07/2024    CO2 15 (L) 11/07/2024     (H) 11/07/2024     Lab Results   Component Value Date    .1 (H) 10/29/2018    CALCIUM 8.9 11/07/2024    PHOS 3.0 10/24/2008     Lab Results   Component Value Date    HGB 8.7 (L) 11/11/2024    WBC 4.5 11/11/2024    HCT 26.8 (L) 11/11/2024      Iron   Date Value Ref Range Status   05/10/2013 73 30 - 160 ug/dL Final     Ferritin   Date Value Ref Range Status   05/10/2013 106 20 - 300 ng/ml Final     Lab Results   Component Value Date    HGBA1C 5.1 11/07/2024     11/07/2024    BUN 64 (H) 11/07/2024     Lab Results   Component Value Date    LDLCALC 121 11/07/2024         Assessment:       1. CKD (chronic kidney disease), stage V    2. Proteinuria,  unspecified type    3. Secondary hyperparathyroidism of renal origin    4. Hypertension, unspecified type    5. Acidosis        Plan:   CKD stage V c eGFR 8 mL/min -  - Cr now ranging 4.1-5.3 on recent admission, last labs in 2022 with Cr of 2.14, eGFR 25 mL/min. Suspect progression over the last 2 years in setting of severely uncontrolled HTN. Changes below to antihypertensives.    UPCR Albuminuric. Repeat UPCR.    Acid-base Continue NaBicarb 650 BID. Repeat labs today.   Secondary hyperparathyroidism Check PTH and Vit D.    Anemia Hgb below CKD goal. Repeat CBC. Continue PO iron supplement.    DM No history   Lipid Management Not taking a statin   ESRD planning Provided education about the stages of CKD, usual progression, and need to monitor for and treat CKD-related disease in an effort to delay progression of CKD.     Patient initially against dialysis due to negative experiences of people close to her that have been on dialysis.   Dialysis modalities discussed today and encouraged ESRD tx options class for which she is amenable and would like to attend with her daughter, Gato.  At the end of today's visit, she is open to the possibility of dialysis and feels she would prefer HD via AVF vs PD, but wants more time to consider before referral for AVF. I have asked her to complete ESRD tx options class in the near future given her low renal function and need for access placement ASAP if she chooses to move forward with dialysis. Medical management also discussed today.      HTN - Uncontrolled. Aldosterone studies previously unremarkable. H/o hypokalemia which thought to be possibly due to HCTZ.   - Instructed patient to hold amlodipine since she is taking nifedipine.  - Instructed to resume carvedilol 6.25 mg BID in addition to nifedipine 60 BID and hydralazine 50 TID. Room to increase coreg and hydralazine further.     LLE edema - monitor with holding amlodipine. Consider US of LE.     All questions patient had  were answered.  Asked if further questions. None. F/u in clinic pending labs today  with labs and urine prior to next visit or sooner if needed.  ER for emergency concerns.      Summary of Plan:  - Stop amlodipine. Resume carvedilol.  - CKD labs today  - ESRD tx options class ASAP  - RTC based on w/u

## 2024-12-18 ENCOUNTER — TELEPHONE (OUTPATIENT)
Facility: CLINIC | Age: 79
End: 2024-12-18
Payer: MEDICARE

## 2024-12-20 ENCOUNTER — TELEPHONE (OUTPATIENT)
Dept: NEPHROLOGY | Facility: CLINIC | Age: 79
End: 2024-12-20
Payer: MEDICARE

## 2024-12-20 DIAGNOSIS — N18.5 CKD (CHRONIC KIDNEY DISEASE), STAGE V: Primary | ICD-10-CM

## 2024-12-20 RX ORDER — CALCITRIOL 0.25 UG/1
0.25 CAPSULE ORAL
Qty: 12 CAPSULE | Refills: 11 | Status: SHIPPED | OUTPATIENT
Start: 2024-12-20 | End: 2025-12-20

## 2024-12-20 RX ORDER — SODIUM BICARBONATE 650 MG/1
650 TABLET ORAL 2 TIMES DAILY
Qty: 60 TABLET | Refills: 11 | Status: SHIPPED | OUTPATIENT
Start: 2024-12-20 | End: 2025-12-20

## 2024-12-20 NOTE — TELEPHONE ENCOUNTER
Call placed to discuss labs and medication changes including addition of sodium bicarbonate and calcitriol. No answer. Unable to leave VM. Unable to leave portal message. Will continue to try to reach patient.

## 2024-12-30 ENCOUNTER — TELEPHONE (OUTPATIENT)
Facility: CLINIC | Age: 79
End: 2024-12-30
Payer: MEDICARE

## 2025-01-08 ENCOUNTER — TELEPHONE (OUTPATIENT)
Facility: CLINIC | Age: 80
End: 2025-01-08
Payer: MEDICARE

## 2025-01-08 NOTE — TELEPHONE ENCOUNTER
Called to schedule ESRD Tx Choices. Unable to leave voicemail. Previous attempts to contact daughters for scheduling, voicemails were not returned.     Patient has upcoming appointment with [facility] in February.

## 2025-02-06 ENCOUNTER — CLINICAL SUPPORT (OUTPATIENT)
Dept: ENDOSCOPY | Facility: HOSPITAL | Age: 80
End: 2025-02-06
Payer: MEDICARE

## 2025-02-06 ENCOUNTER — TELEPHONE (OUTPATIENT)
Dept: ENDOSCOPY | Facility: HOSPITAL | Age: 80
End: 2025-02-06

## 2025-02-06 DIAGNOSIS — Z12.11 COLON CANCER SCREENING: ICD-10-CM

## 2025-02-25 DIAGNOSIS — J45.20 MILD INTERMITTENT ASTHMA WITHOUT COMPLICATION: ICD-10-CM

## 2025-02-25 RX ORDER — FLUTICASONE PROPIONATE AND SALMETEROL 100; 50 UG/1; UG/1
1 POWDER RESPIRATORY (INHALATION) 2 TIMES DAILY
Qty: 60 EACH | Refills: 5 | Status: SHIPPED | OUTPATIENT
Start: 2025-02-25